# Patient Record
Sex: MALE | Race: WHITE | Employment: UNEMPLOYED | URBAN - METROPOLITAN AREA
[De-identification: names, ages, dates, MRNs, and addresses within clinical notes are randomized per-mention and may not be internally consistent; named-entity substitution may affect disease eponyms.]

---

## 2020-01-15 ENCOUNTER — CLINICAL SUPPORT (OUTPATIENT)
Dept: PULMONOLOGY | Age: 50
End: 2020-01-15

## 2020-01-15 VITALS — WEIGHT: 140 LBS | BODY MASS INDEX: 21.97 KG/M2 | HEIGHT: 67 IN

## 2020-01-15 DIAGNOSIS — J44.9 CHRONIC OBSTRUCTIVE PULMONARY DISEASE, UNSPECIFIED COPD TYPE (HCC): ICD-10-CM

## 2020-01-15 DIAGNOSIS — J45.909 UNCOMPLICATED ASTHMA, UNSPECIFIED ASTHMA SEVERITY, UNSPECIFIED WHETHER PERSISTENT: Primary | ICD-10-CM

## 2020-01-15 NOTE — PROGRESS NOTES
Verbal Order with read back per Delmus Burkitt, MD  For PFT smart panel. AMB POC PFT complete w/ bronchodilator  AMB POC PFT complete w/o bronchodilator    Dr. Chato López MD will co-sign the orders.

## 2020-02-06 ENCOUNTER — OFFICE VISIT (OUTPATIENT)
Dept: PULMONOLOGY | Age: 50
End: 2020-02-06

## 2020-02-06 VITALS
HEART RATE: 82 BPM | SYSTOLIC BLOOD PRESSURE: 128 MMHG | BODY MASS INDEX: 21.88 KG/M2 | RESPIRATION RATE: 18 BRPM | OXYGEN SATURATION: 96 % | WEIGHT: 139.4 LBS | TEMPERATURE: 97.4 F | DIASTOLIC BLOOD PRESSURE: 72 MMHG | HEIGHT: 67 IN

## 2020-02-06 DIAGNOSIS — J44.9 COPD WITH ASTHMA (HCC): Primary | ICD-10-CM

## 2020-02-06 RX ORDER — FLUTICASONE FUROATE AND VILANTEROL 200; 25 UG/1; UG/1
1 POWDER RESPIRATORY (INHALATION) DAILY
Qty: 1 INHALER | Refills: 3 | Status: SHIPPED | OUTPATIENT
Start: 2020-02-06 | End: 2020-02-18 | Stop reason: CLARIF

## 2020-02-06 RX ORDER — FLUTICASONE FUROATE AND VILANTEROL 200; 25 UG/1; UG/1
1 POWDER RESPIRATORY (INHALATION) DAILY
Qty: 1 INHALER | Refills: 0 | Status: SHIPPED | COMMUNITY
Start: 2020-02-06 | End: 2020-02-18 | Stop reason: CLARIF

## 2020-02-06 RX ORDER — FLUTICASONE FUROATE AND VILANTEROL 200; 25 UG/1; UG/1
1 POWDER RESPIRATORY (INHALATION) DAILY
Qty: 1 INHALER | Refills: 0 | Status: SHIPPED | COMMUNITY
Start: 2020-02-06 | End: 2020-02-06 | Stop reason: SDUPTHER
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
## 2020-02-06 NOTE — PROGRESS NOTES
Morgan Noreentorreykati presents today for   Chief Complaint   Patient presents with    Asthma    COPD    Shortness of Breath       Is someone accompanying this pt? No     Is the patient using any DME equipment during OV? No     -DME Company n/a     Depression Screening:  3 most recent PHQ Screens 2/6/2020   Little interest or pleasure in doing things Not at all   Feeling down, depressed, irritable, or hopeless Not at all   Total Score PHQ 2 0       Learning Assessment:  No flowsheet data found. Abuse Screening:  No flowsheet data found. Fall Risk  No flowsheet data found. Coordination of Care:  1. Have you been to the ER, urgent care clinic since your last visit? Hospitalized since your last visit? Yes; Name: [facility]    2. Have you seen or consulted any other health care providers outside of the [address] [place] [address] since your last visit? Include any pap smears or colon screening.  No

## 2020-02-18 ENCOUNTER — TELEPHONE (OUTPATIENT)
Dept: PULMONOLOGY | Age: 50
End: 2020-02-18

## 2020-02-18 NOTE — TELEPHONE ENCOUNTER
Pt states his insurance doesn't cover Breo and Incruse, wanting to know if there's alternatives. Please advise [phone].

## 2020-02-18 NOTE — TELEPHONE ENCOUNTER
Pt with Medicaid insurance.  Preferred meds are   Fluticasone Salmeterol or Dulera &  Atrovent HFA and maybe Spiriva Handihaler

## 2020-06-10 RX ORDER — ALBUTEROL SULFATE 90 UG/1
2 AEROSOL, METERED RESPIRATORY (INHALATION)
Qty: 1 INHALER | Refills: 1 | Status: SHIPPED | OUTPATIENT
Start: 2020-06-10 | End: 2020-08-06 | Stop reason: SDUPTHER

## 2020-06-10 RX ORDER — MOMETASONE FUROATE AND FORMOTEROL FUMARATE DIHYDRATE 100; 5 UG/1; UG/1
2 AEROSOL RESPIRATORY (INHALATION) 2 TIMES DAILY
Qty: 1 INHALER | Refills: 5 | Status: SHIPPED | OUTPATIENT
Start: 2020-06-10 | End: 2020-08-06 | Stop reason: SDUPTHER

## 2020-07-07 DIAGNOSIS — J45.909 UNCOMPLICATED ASTHMA, UNSPECIFIED ASTHMA SEVERITY, UNSPECIFIED WHETHER PERSISTENT: ICD-10-CM

## 2020-07-07 DIAGNOSIS — J44.9 COPD WITH ASTHMA (HCC): Primary | ICD-10-CM

## 2020-07-07 NOTE — PROGRESS NOTES
Order placed for covid-19 test, per Verbal Order from Dr. Ave Taylor on 7/7/2020. Last office visit: 2/06/2020  Follow up Visit: 7/22/2020    Provider is aware of last office visit and follow up. No further action requested from provider.

## 2020-07-16 ENCOUNTER — VIRTUAL VISIT (OUTPATIENT)
Dept: FAMILY MEDICINE CLINIC | Age: 50
End: 2020-07-16

## 2020-07-16 PROBLEM — F12.90 MARIJUANA USER: Status: ACTIVE | Noted: 2019-11-17

## 2020-07-16 PROBLEM — J44.9 COPD (CHRONIC OBSTRUCTIVE PULMONARY DISEASE) (HCC): Status: ACTIVE | Noted: 2019-11-16

## 2020-07-16 PROBLEM — Z91.199 NON-COMPLIANCE: Status: ACTIVE | Noted: 2019-11-17

## 2020-07-16 PROBLEM — I21.4 NSTEMI (NON-ST ELEVATED MYOCARDIAL INFARCTION) (HCC): Status: ACTIVE | Noted: 2020-06-24

## 2020-07-16 PROBLEM — Z78.9 DRINKS BEER: Status: ACTIVE | Noted: 2019-11-17

## 2020-07-16 RX ORDER — AMOXICILLIN 250 MG
1 CAPSULE ORAL 2 TIMES DAILY
COMMUNITY
Start: 2020-07-01 | End: 2020-11-11 | Stop reason: ALTCHOICE

## 2020-07-16 RX ORDER — GUAIFENESIN 100 MG/5ML
81 LIQUID (ML) ORAL DAILY
COMMUNITY
Start: 2020-07-02 | End: 2020-07-20 | Stop reason: SDUPTHER

## 2020-07-16 RX ORDER — BUPRENORPHINE HYDROCHLORIDE, NALOXONE HYDROCHLORIDE 12; 3 MG/1; MG/1
FILM, SOLUBLE BUCCAL; SUBLINGUAL
COMMUNITY
Start: 2020-05-11 | End: 2020-07-20 | Stop reason: ALTCHOICE

## 2020-07-16 RX ORDER — OXYCODONE HYDROCHLORIDE 5 MG/1
5 TABLET ORAL
COMMUNITY
Start: 2020-07-01 | End: 2020-11-11 | Stop reason: ALTCHOICE

## 2020-07-16 RX ORDER — LISINOPRIL 10 MG/1
10 TABLET ORAL DAILY
COMMUNITY
Start: 2020-07-01 | End: 2020-07-20 | Stop reason: SDUPTHER

## 2020-07-16 RX ORDER — IPRATROPIUM BROMIDE AND ALBUTEROL SULFATE 2.5; .5 MG/3ML; MG/3ML
SOLUTION RESPIRATORY (INHALATION)
COMMUNITY
Start: 2020-06-14 | End: 2020-07-16

## 2020-07-16 RX ORDER — SPIRONOLACTONE 25 MG/1
25 TABLET ORAL DAILY
COMMUNITY
Start: 2020-07-02 | End: 2020-07-20 | Stop reason: SDUPTHER

## 2020-07-16 RX ORDER — NALOXONE HYDROCHLORIDE 4 MG/.1ML
SPRAY NASAL
COMMUNITY
Start: 2020-05-04 | End: 2020-11-11

## 2020-07-16 RX ORDER — CARVEDILOL 12.5 MG/1
12.5 TABLET ORAL 2 TIMES DAILY WITH MEALS
COMMUNITY
Start: 2020-07-01 | End: 2020-07-20 | Stop reason: SDUPTHER

## 2020-07-16 RX ORDER — POLYETHYLENE GLYCOL 3350 17 G/17G
17 POWDER, FOR SOLUTION ORAL DAILY
COMMUNITY
Start: 2020-07-02 | End: 2020-07-20 | Stop reason: ALTCHOICE

## 2020-07-16 RX ORDER — ATORVASTATIN CALCIUM 40 MG/1
40 TABLET, FILM COATED ORAL
COMMUNITY
Start: 2020-07-01 | End: 2020-07-20 | Stop reason: SDUPTHER

## 2020-07-16 RX ORDER — ALBUTEROL SULFATE 0.83 MG/ML
2.5 SOLUTION RESPIRATORY (INHALATION)
COMMUNITY
Start: 2019-11-17 | End: 2021-06-04 | Stop reason: SDUPTHER

## 2020-07-16 RX ORDER — BUPRENORPHINE HYDROCHLORIDE, NALOXONE HYDROCHLORIDE 8; 2 MG/1; MG/1
FILM, SOLUBLE BUCCAL; SUBLINGUAL
COMMUNITY
Start: 2020-06-11 | End: 2020-07-20 | Stop reason: ALTCHOICE

## 2020-07-16 NOTE — PROGRESS NOTES
Chief Complaint   Patient presents with    New Patient    Other     Had Mycardial Infarction 6/24/20       1. Have you been to the ER, urgent care clinic since your last visit? Hospitalized since your last visit? Yes Brand[facility]la 55 MI 6/24/20    2. Have you seen or consulted any other health care providers outside of the [address] [place] [address] since your last visit? Include any pap smears or colon screening.  Cardiology

## 2020-07-16 NOTE — PROGRESS NOTES
This encounter was created in error - please disregard. intiated visit with patient. He stated he was not at home and did not have his medications with him. Stated he would need to reschedule. I explained to him I have all his information including meds and hospitalizations. However, if he would like to reschedule that would be fine. He then said he could do a visit tomorrow. I informed him I was booked tomorrow.  He then became beligerent and told me   \"you know I have a life too, [id number] [employer]holes\" and hung up

## 2020-07-20 ENCOUNTER — OFFICE VISIT (OUTPATIENT)
Dept: CARDIOLOGY CLINIC | Age: 50
End: 2020-07-20

## 2020-07-20 VITALS
HEART RATE: 95 BPM | WEIGHT: 128 LBS | BODY MASS INDEX: 20.09 KG/M2 | DIASTOLIC BLOOD PRESSURE: 70 MMHG | SYSTOLIC BLOOD PRESSURE: 94 MMHG | HEIGHT: 67 IN | OXYGEN SATURATION: 95 % | TEMPERATURE: 97.4 F

## 2020-07-20 DIAGNOSIS — I50.9 CONGESTIVE HEART FAILURE, UNSPECIFIED HF CHRONICITY, UNSPECIFIED HEART FAILURE TYPE (HCC): Primary | ICD-10-CM

## 2020-07-20 RX ORDER — SPIRONOLACTONE 25 MG/1
25 TABLET ORAL DAILY
Qty: 90 TAB | Refills: 3 | Status: SHIPPED | OUTPATIENT
Start: 2020-07-20

## 2020-07-20 RX ORDER — GUAIFENESIN 100 MG/5ML
81 LIQUID (ML) ORAL DAILY
Qty: 90 TAB | Refills: 3 | Status: SHIPPED | OUTPATIENT
Start: 2020-07-20 | End: 2021-01-13 | Stop reason: SDUPTHER

## 2020-07-20 RX ORDER — CARVEDILOL 12.5 MG/1
12.5 TABLET ORAL 2 TIMES DAILY WITH MEALS
Qty: 180 TAB | Refills: 3 | Status: SHIPPED | OUTPATIENT
Start: 2020-07-20 | End: 2020-11-11 | Stop reason: ALTCHOICE

## 2020-07-20 RX ORDER — ATORVASTATIN CALCIUM 40 MG/1
40 TABLET, FILM COATED ORAL
Qty: 90 TAB | Refills: 3 | Status: SHIPPED | OUTPATIENT
Start: 2020-07-20

## 2020-07-20 RX ORDER — LISINOPRIL 10 MG/1
10 TABLET ORAL DAILY
Qty: 90 TAB | Refills: 3 | Status: SHIPPED | OUTPATIENT
Start: 2020-07-20

## 2020-07-20 NOTE — PATIENT INSTRUCTIONS
Echo-Testing   Echo- ( to be done in 3 months ) Please call [facility] central scheduling at [phone]  to schedule an appointment.    All testing is completed at [address] [place] [address], [facility]  **call office three days after testing for results**

## 2020-07-20 NOTE — PROGRESS NOTES
Soledad Zee presents today for   Chief Complaint   Patient presents with   [facility] preferred language for health care discussion is english/other. Is someone accompanying this pt? no    Is the patient using any DME equipment during [address]? no    Depression Screening:  3 most recent PHQ Screens 7/20/2020   Little interest or pleasure in doing things Not at all   Feeling down, depressed, irritable, or hopeless Not at all   Total Score PHQ 2 0   Feeling tired or having little energy -   Poor appetite, weight loss, or overeating -   Feeling bad about yourself - or that you are a failure or have let yourself or your family down -   Trouble concentrating on things such as school, work, reading, or watching TV -   Moving or speaking so slowly that other people could have noticed; or the opposite being so fidgety that others notice -   Thoughts of being better off dead, or hurting yourself in some way -       Learning Assessment:  Learning Assessment 7/16/2020   PRIMARY LEARNER Patient   HIGHEST LEVEL OF EDUCATION - PRIMARY LEARNER  2 YEARS [address] CAREGIVER No   PRIMARY LANGUAGE ENGLISH   LEARNER PREFERENCE PRIMARY READING   ANSWERED BY Pari Carlson   RELATIONSHIP SELF       Abuse Screening:  No flowsheet data found. Fall Risk  Fall Risk Assessment, last 12 mths 7/16/2020   Able to walk? Yes   Fall in past 12 months? No       Pt currently taking Anticoagulant therapy? yes  Coordination of Care:  1. Have you been to the ER, urgent care clinic since your last visit? Hospitalized since your last visit? yes    2. Have you seen or consulted any other health care providers outside of the [address] [place] [address] since your last visit? Include any pap smears or colon screening.   yes

## 2020-07-22 ENCOUNTER — OFFICE VISIT (OUTPATIENT)
Dept: PULMONOLOGY | Age: 50
End: 2020-07-22

## 2020-07-22 VITALS
RESPIRATION RATE: 20 BRPM | OXYGEN SATURATION: 98 % | TEMPERATURE: 98.1 F | SYSTOLIC BLOOD PRESSURE: 90 MMHG | BODY MASS INDEX: 19.87 KG/M2 | DIASTOLIC BLOOD PRESSURE: 60 MMHG | HEIGHT: 67 IN | WEIGHT: 126.6 LBS | HEART RATE: 76 BPM

## 2020-07-22 DIAGNOSIS — J44.9 STAGE 3 SEVERE COPD BY GOLD CLASSIFICATION (HCC): Primary | ICD-10-CM

## 2020-07-22 DIAGNOSIS — I21.4 NSTEMI (NON-ST ELEVATED MYOCARDIAL INFARCTION) (HCC): ICD-10-CM

## 2020-07-22 DIAGNOSIS — J44.9 COPD WITH ASTHMA (HCC): ICD-10-CM

## 2020-07-22 DIAGNOSIS — J45.909 UNCOMPLICATED ASTHMA, UNSPECIFIED ASTHMA SEVERITY, UNSPECIFIED WHETHER PERSISTENT: ICD-10-CM

## 2020-07-22 NOTE — PATIENT INSTRUCTIONS
Chronic Obstructive Pulmonary Disease (COPD): Care Instructions  Your Care Instructions     Chronic obstructive pulmonary disease (COPD) is a general term for a group of lung diseases, including emphysema and chronic bronchitis. People with COPD have decreased airflow in and out of the lungs, which makes it hard to breathe. The airways also can get clogged with thick mucus. Cigarette smoking is a major cause of COPD. Although there is no cure for COPD, you can slow its progress. Following your treatment plan and taking care of yourself can help you feel better and live longer. Follow-up care is a key part of your treatment and safety. Be sure to make and go to all appointments, and call your doctor if you are having problems. It's also a good idea to know your test results and keep a list of the medicines you take. How can you care for yourself at home? Staying healthy  · Do not smoke. This is the most important step you can take to prevent more damage to your lungs. If you need help quitting, talk to your doctor about stop-smoking programs and medicines. These can increase your chances of quitting for good. · Avoid colds and flu. Get a pneumococcal vaccine shot. If you have had one before, ask your doctor whether you need a second dose. Get the flu vaccine every fall. If you must be around people with colds or the flu, wash your hands often. · Avoid secondhand smoke, air pollution, and high altitudes. Also avoid cold, dry air and hot, humid air. Stay at home with your windows closed when air pollution is bad. Medicines and oxygen therapy  · Take your medicines exactly as prescribed. Call your doctor if you think you are having a problem with your medicine. You may be taking medicines such as:  ? Bronchodilators. These help open your airways and make breathing easier. They are either short-acting (work for 6 to 9 hours) or long-acting (work for 24 hours).  You inhale most bronchodilators, so they start to act quickly. Always carry your quick-relief inhaler with you in case you need it while you are away from home. ? Corticosteroids (prednisone, budesonide). These reduce airway inflammation. They come in pill or inhaled form. You must take these medicines every day for them to work well. · Ask your doctor or pharmacist if a spacer is right for you. A spacer may help you get more inhaled medicine to your lungs. If you use one, ask how to use it properly. · Do not take any vitamins, over-the-counter medicine, or herbal products without talking to your doctor first.  · If your doctor prescribed antibiotics, take them as directed. Do not stop taking them just because you feel better. You need to take the full course of antibiotics. · If you use oxygen therapy, use the flow rate your doctor has recommended. Don't change it without talking to your doctor first. Oxygen therapy boosts the amount of oxygen in your blood and helps you breathe easier. Activity  · Get regular exercise. Walking is an easy way to get exercise. Start out slowly, and walk a little more each day. · Pay attention to your breathing. You are exercising too hard if you can't talk while you exercise. · Take short rest breaks when doing household chores and other activities. · Learn breathing methods--such as breathing through pursed lips--to help you become less short of breath. · If your doctor has not set you up with a pulmonary rehabilitation program, ask if rehab is right for you. Rehab includes exercise programs, education about your disease and how to manage it, help with diet and other changes, and emotional support. Diet  · Eat regular, healthy meals. Use bronchodilators about 1 hour before you eat to make it easier to eat. Eat several small meals instead of three large ones. Drink beverages at the end of the meal. Avoid foods that are hard to chew. · Eat foods that contain protein so you don't lose muscle mass.   · Talk with your doctor if you gain too much weight or if you lose weight without trying. Mental health  · Talk to your family, friends, or a therapist about your feelings. Some people feel frightened, angry, hopeless, helpless, and even guilty. Talking openly about bad feelings can help you cope. If these feelings last, talk to your doctor. When should you call for help? [id number] anytime you think you may need emergency care. For example, call if:  · You have severe trouble breathing. Call your doctor now or seek immediate medical care if:  · You have new or worse trouble breathing. · You cough up blood. · You have a fever. Watch closely for changes in your health, and be sure to contact your doctor if:  · You cough more deeply or more often, especially if you notice more mucus or a change in the color of your mucus. · You have new or worse swelling in your legs or belly. · You are not getting better as expected. Where can you learn more? Go to http://katiuska-venessa.info/  Enter H561 in the search box to learn more about \"Chronic Obstructive Pulmonary Disease (COPD): Care Instructions. \"  Current as of: February 24, 2020               Content Version: 12.5  © 0506-4875 Healthwise, Incorporated. Care instructions adapted under license by [employer] (which disclaims liability or warranty for this information). If you have questions about a medical condition or this instruction, always ask your healthcare professional. [place] [address] any warranty or liability for your use of this information.
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
## 2020-07-22 NOTE — PROGRESS NOTES
The pt. States he had an MI 6/24 [facility] SPECIAL SURGERY.    Was given a Neb upon disch. From [facility]. Will need a supplies order. Teresa Jones presents today for   Chief Complaint   Patient presents with    Breathing Problem     F/U to 2/6  Alpha 1 2/6       Is someone accompanying this pt? No    Is the patient using any DME equipment during [address]? nebulizer    -DME Company     Depression Screening:  3 most recent PHQ Screens 7/20/2020   Little interest or pleasure in doing things Not at all   Feeling down, depressed, irritable, or hopeless Not at all   Total Score PHQ 2 0   Feeling tired or having little energy -   Poor appetite, weight loss, or overeating -   Feeling bad about yourself - or that you are a failure or have let yourself or your family down -   Trouble concentrating on things such as school, work, reading, or watching TV -   Moving or speaking so slowly that other people could have noticed; or the opposite being so fidgety that others notice -   Thoughts of being better off dead, or hurting yourself in some way -       Learning Assessment:  Learning Assessment 7/16/2020   PRIMARY LEARNER Patient   HIGHEST LEVEL OF EDUCATION - PRIMARY LEARNER  2 YEARS [address] CAREGIVER No   PRIMARY LANGUAGE ENGLISH   LEARNER PREFERENCE PRIMARY READING   ANSWERED BY Kareem Alvarez   RELATIONSHIP SELF       Abuse Screening: In past.  Smokes marijuana x 2 daily    Fall Risk  Fall Risk Assessment, last 12 mths 7/16/2020   Able to walk? Yes   Fall in past 12 months? No         Coordination of Care:  1. Have you been to the ER, urgent care clinic since your last visit? Hospitalized since your last visit? [facility] 6/24    2. Have you seen or consulted any other health care providers outside of the [address] [place] [address] since your last visit? [facility]    Medication variance in dosage/sig per patient as follows: Per Med. Rec.

## 2020-07-27 ENCOUNTER — HOSPITAL ENCOUNTER (OUTPATIENT)
Dept: NON INVASIVE DIAGNOSTICS | Age: 50
Discharge: HOME | End: 2020-07-27
Attending: INTERNAL MEDICINE
Payer: COMMERCIAL

## 2020-07-27 VITALS
BODY MASS INDEX: 19.78 KG/M2 | SYSTOLIC BLOOD PRESSURE: 90 MMHG | DIASTOLIC BLOOD PRESSURE: 60 MMHG | HEIGHT: 67 IN | WEIGHT: 126 LBS

## 2020-07-27 DIAGNOSIS — I50.9 CONGESTIVE HEART FAILURE, UNSPECIFIED HF CHRONICITY, UNSPECIFIED HEART FAILURE TYPE (HCC): ICD-10-CM

## 2020-07-27 LAB
ECHO AO ROOT DIAM: 0 CM
ECHO IVC PROX: 1.42 CM
ECHO IVC SNIFF: 1.42 CM
ECHO LA MAJOR AXIS: 3.22 CM
ECHO LA MINOR AXIS: 1.94 CM
ECHO LV EDV A2C: 91.9 ML
ECHO LV EDV A4C: 101 ML
ECHO LV EDV BP: 99 ML (ref 67–155)
ECHO LV EDV INDEX A4C: 60.8 ML/M2
ECHO LV EDV INDEX BP: 59.6 ML/M2
ECHO LV EDV NDEX A2C: 55.3 ML/M2
ECHO LV EDV TEICHHOLZ: 0.48 ML
ECHO LV EJECTION FRACTION A2C: 42 %
ECHO LV EJECTION FRACTION A4C: 47 %
ECHO LV EJECTION FRACTION BIPLANE: 43.9 % (ref 55–100)
ECHO LV ESV A2C: 53.6 ML
ECHO LV ESV A4C: 53.2 ML
ECHO LV ESV BP: 55.5 ML (ref 22–58)
ECHO LV ESV INDEX A2C: 32.3 ML/M2
ECHO LV ESV INDEX A4C: 32 ML/M2
ECHO LV ESV INDEX BP: 33.4 ML/M2
ECHO LV ESV TEICHHOLZ: 0.44 ML
ECHO LV INTERNAL DIMENSION DIASTOLIC: 4.25 CM (ref 4.2–5.9)
ECHO LV INTERNAL DIMENSION SYSTOLIC: 4.1 CM
ECHO LV IVSD: 0.9 CM (ref 0.6–1)
ECHO LV MASS 2D: 117.3 G (ref 88–224)
ECHO LV MASS INDEX 2D: 70.6 G/M2 (ref 49–115)
ECHO LV POSTERIOR WALL DIASTOLIC: 0.86 CM (ref 0.6–1)
ECHO LVOT CARDIAC OUTPUT: 12.38 LITER/MINUTE
ECHO LVOT DIAM: 1.86 CM
ECHO LVOT PEAK GRADIENT: 5.3 MMHG
ECHO LVOT PEAK VELOCITY: 115.33 CM/S
ECHO LVOT SV: 71 ML
ECHO LVOT VTI: 26.02 CM
ECHO MV A VELOCITY: 46.84 CM/S
ECHO MV E DECELERATION TIME (DT): 335 MS
ECHO MV E VELOCITY: 46.11 CM/S
ECHO MV E/A RATIO: 0.98
ECHO RA MINOR AXIS: 3.7 CM
ECHO TV REGURGITANT MAX VELOCITY: 164.27 CM/S
ECHO TV REGURGITANT PEAK GRADIENT: 10.8 MMHG
LVFS 2D: 3.47 %
LVOT MG: 2.66 MMHG
LVOT MV: 0.76 CM/S
LVSV (MOD BI): 26.46 ML
LVSV (MOD SINGLE 4C): 29.1 ML
LVSV (MOD SINGLE): 23.32 ML
LVSV (TEICH): 3.94 ML
MV DEC SLOPE: 1.38

## 2020-07-27 PROCEDURE — 93306 TTE W/DOPPLER COMPLETE: CPT

## 2020-08-04 ENCOUNTER — HOSPITAL ENCOUNTER (OUTPATIENT)
Dept: CARDIAC REHAB | Age: 50
Discharge: HOME | End: 2020-08-04
Payer: COMMERCIAL

## 2020-08-06 RX ORDER — ALBUTEROL SULFATE 90 UG/1
2 AEROSOL, METERED RESPIRATORY (INHALATION)
Qty: 2 INHALER | Refills: 3 | Status: SHIPPED | OUTPATIENT
Start: 2020-08-06 | End: 2021-02-08 | Stop reason: SDUPTHER

## 2020-08-06 RX ORDER — MOMETASONE FUROATE AND FORMOTEROL FUMARATE DIHYDRATE 100; 5 UG/1; UG/1
2 AEROSOL RESPIRATORY (INHALATION) 2 TIMES DAILY
Qty: 1 INHALER | Refills: 5 | Status: SHIPPED | OUTPATIENT
Start: 2020-08-06 | End: 2021-01-13 | Stop reason: SDUPTHER

## 2020-08-06 NOTE — TELEPHONE ENCOUNTER
Pt requesting refills on albuterol and Dulera to be sent to [facility]. Pt would like to know if he can get 2 albuterols at a time.

## 2020-08-07 ENCOUNTER — HOSPITAL ENCOUNTER (OUTPATIENT)
Dept: CARDIAC REHAB | Age: 50
Discharge: HOME | End: 2020-08-07
Payer: COMMERCIAL

## 2020-08-07 VITALS — WEIGHT: 121 LBS | BODY MASS INDEX: 18.95 KG/M2

## 2020-08-07 PROCEDURE — 93798 PHYS/QHP OP CAR RHAB W/ECG: CPT

## 2020-08-07 NOTE — PROGRESS NOTES
Outpatient Behavioral Health Evaluation Order    Piyush Moore is a 52y.o. year old male with Non-ST elevation (NSTEMI) myocardial infarction [I21.4]. He is enrolled in cardiac rehabilitation and the treating clinician believes the patient is demonstrating signs of anxiety, depression, substance abuse and alcoholism. Lira [id number] admitting PHQ-9 depression score was 21 which is considered severe. Please co-sign this note if you would like your patient to be evaluated by a LCSW in our clinic. Thank you.

## 2020-08-07 NOTE — PROGRESS NOTES
INITIAL CARDIAC REHAB ITP FOR REVIEW AND SIGNATURE    Cardiac ITP      CR INTAKE from 8/7/2020 in [facility]    Treatment Diagnosis 1  NSTEMI    NSTEMI Date  06/24/20    Referral Date  07/28/20    Significant Cardiovascular History  --  [CAD, HTN]    ITP Visit Type  Initial Assessment    1st Date of Exercise   08/07/20    ITP Next Review Date  09/07/20    Visit #/Total Visits  1/36    EF %  50 %    Risk Stratification  Moderate    ITP  Exercise, Psychosocial, Tobacco, Education, Nutrition    Stages of Change  Preparation    DASI Total Score  24.2    Total Score  0    Safety Level I  --    Safety Level II  --    Test  Six minute walk test    Mode  Treadmill, Bike, Stepper, Ergometer    Frequency per week  2-3    Duration per session  35-55    Intensity  METS        2.5-4.0    RPE  11-13    Target Heart Rate  103-120    Resistance Training  Yes    Resting BP  98/69    Peak BP  115/76    Is BP WDL?   Yes    Type  walking    Frequency  3-4    Duration  30 minutes    Resistance Training  Yes    Education  Self pulse, Exercise safety, RPE scale, Signs/Symptoms to report    Target Goal(s)  Individual exercise RX    Patient Stated Exercise Goals  start lifting weights    Stages of Change  Preparation    [facility] Total Score  35    PHQ 9 Score  21    Interventions  Cardiologist notified    Len [address] worker    Currently Taking Psychotropic Meds  No    Medication Changes  No    Education  Impact self care behaviors on health    Target Goal(s)  Maximizes coping skills    Uses Stress Mgmt Techniques  No    Stages of Change  Preparation    Weight   54.9 kg (121 lb)    Height   5' 7\" (1.702 m)    BMI  18.99    Weight Goal  146lbs    Waist Circumference   32\"    Alcohol  Weekly    Type  beer, whiskey    Amount  10 drinks per week    Rate Your Plate Total Score  52    Dietitian Consult  Yes    Nurse/Patient Discussion  Yes    Nutrition Goals  learn what to eat    Nutrition Class  Yes    Education  Low sodium diet, Healthy eating    Patient Stated Nutrition Goals  eat more healthy proteins    Learning Barrier  Ready to learn    Education  CAD, Risk factors, Med Compliance    Hypertension  Yes    Hypertension Controlled  Yes    Is BP WDL? Yes    Med(s) Change  Yes    BP Meds  coreg, lisinopril,    Target Goals  Medication compliance, Risk factors    Exercise      CR INTAKE from 8/7/2020 in [facility]    Any problems changes since your last visit  Denies    Any symptoms while exercising  denies    Psychosocial/Stress Level  0    Resting EKG rhythm  SB    Tobacco Use  None    Enter O2 Saturation and Liter Flow  98    ITP Next Review Date  09/07/20    Visit Number/Total Visits  1/36    What is plan for next session  start exercise Rx    On Call Medical Director Immediately Available  Dionisio    Target Heart Rate(Range)  103-120    Resting HR  57    Resting BP  98/69    Recovery HR  54    Recovery BP  104/75    Weight  54.9 kg (121 lb)    Exercise EKG Rhythm  SR    Exercise Duration  6 minutes    Peak HR  77    Peak BP  115/76    Peak RPE  11    Peak Mets  2.9    SOB  0    Asymptomatic  yes    Total Minutes  6        Cardiac Rehab Initial Treatment Note/Plan    Maik Vieyra 52 y.o. presented to cardiac rehab for an intake and a six minute walk test today with a primary diagnosis of NSTEMI. Patient's EF is 45-50%. Irma Malhotra has a history of Hypertension, Hyperlipidemia, Coronary artery disease and status post coronary artery stenting. Cardiac risk factors include smoking/ tobacco exposure, dyslipidemia, alcohol/drug abuse, hypertension, stress and these were reviewed with patient. Irma Malhotra is not  and lives with son. PHQ-9, depression score, is 21 and this is considered to bevery high. The result was discussed with patient who admits the score to be accurate and social work visits were encouraged.     Patient denied chest pain or SOB during 6 minute walk and the cardiac rhythm was in (Normal Sinus Rhythm. Kristi Leon will attend exercise and educational sessions 2 days a week in cardiac rehab for 36 sessions. Goals for Rehab:    Patient name: Kristi Leon : 1970         Goals Comments   1. Start lifting weights   [x] initial  [] met                  [] not met  [] progressing Start with resistance training. Increase weights by 2lbs by next recert   2. Gain 25lbs   [x] initial  [] met                  [] not met  [] progressing Start eating more healthy proteins and stop drinking alcohol    3.  Stop drinking alcohol    [x] initial  [] met                  [] not met  [] progressing Decrease from 10 drinks per week to 5 drinks per week by next recert                 Navya Jameson RN 2020 10:01 AM

## 2020-08-11 ENCOUNTER — HOSPITAL ENCOUNTER (OUTPATIENT)
Dept: CARDIAC REHAB | Age: 50
Discharge: HOME | End: 2020-08-11
Payer: COMMERCIAL

## 2020-08-11 VITALS — BODY MASS INDEX: 19.11 KG/M2 | WEIGHT: 122 LBS

## 2020-08-11 PROCEDURE — 93797 PHYS/QHP OP CAR RHAB WO ECG: CPT

## 2020-08-11 PROCEDURE — 93798 PHYS/QHP OP CAR RHAB W/ECG: CPT

## 2020-08-13 ENCOUNTER — HOSPITAL ENCOUNTER (OUTPATIENT)
Dept: CARDIAC REHAB | Age: 50
Discharge: HOME | End: 2020-08-13
Payer: COMMERCIAL

## 2020-08-13 VITALS — WEIGHT: 124 LBS | BODY MASS INDEX: 19.42 KG/M2

## 2020-08-13 PROCEDURE — 93798 PHYS/QHP OP CAR RHAB W/ECG: CPT

## 2020-08-18 ENCOUNTER — HOSPITAL ENCOUNTER (OUTPATIENT)
Dept: CARDIAC REHAB | Age: 50
Discharge: HOME | End: 2020-08-18
Payer: COMMERCIAL

## 2020-08-18 VITALS — BODY MASS INDEX: 19.42 KG/M2 | WEIGHT: 124 LBS

## 2020-08-18 PROCEDURE — 93797 PHYS/QHP OP CAR RHAB WO ECG: CPT

## 2020-08-18 PROCEDURE — 93798 PHYS/QHP OP CAR RHAB W/ECG: CPT

## 2020-08-18 NOTE — PROGRESS NOTES
Pt. Initial BP in the 90's to low 282'F systolic when coming in for cardiac rehab. Pt. Complains of some intermittent dizziness. Updated pt. cardiologist Dr. Lourdes Anderson via text page. Will wait to hear back about a possible change in the pt. medications.

## 2020-08-20 ENCOUNTER — APPOINTMENT (OUTPATIENT)
Dept: CARDIAC REHAB | Age: 50
End: 2020-08-20
Payer: COMMERCIAL

## 2020-08-25 ENCOUNTER — HOSPITAL ENCOUNTER (OUTPATIENT)
Dept: CARDIAC REHAB | Age: 50
Discharge: HOME | End: 2020-08-25
Payer: COMMERCIAL

## 2020-08-25 VITALS — BODY MASS INDEX: 19.42 KG/M2 | WEIGHT: 124 LBS

## 2020-08-25 PROCEDURE — 93797 PHYS/QHP OP CAR RHAB WO ECG: CPT

## 2020-08-25 PROCEDURE — 93798 PHYS/QHP OP CAR RHAB W/ECG: CPT

## 2020-09-03 ENCOUNTER — HOSPITAL ENCOUNTER (OUTPATIENT)
Dept: CARDIAC REHAB | Age: 50
Discharge: HOME | End: 2020-09-03
Payer: COMMERCIAL

## 2020-09-03 VITALS — WEIGHT: 125 LBS | BODY MASS INDEX: 19.58 KG/M2

## 2020-09-03 PROCEDURE — 90791 PSYCH DIAGNOSTIC EVALUATION: CPT

## 2020-09-03 PROCEDURE — 93798 PHYS/QHP OP CAR RHAB W/ECG: CPT

## 2020-09-03 PROCEDURE — 93797 PHYS/QHP OP CAR RHAB WO ECG: CPT

## 2020-09-04 NOTE — PROGRESS NOTES
CARDIAC ITP REASSESSMENT FOR REVIEW AND SIGNATURE       Patient name: Teresa Jones : 1970     Visits from Start of Care: 10      Reporting Period:  to     Subjective Reports: sometimes lightheaded when standing up to quickly, otherwise doing well in cardiac rehab    Goals Comments   1. Increase resistance weight   [] met                  [] not met  [x] progressing  Increase dumbbell weights by 1lb by next recert. 2. Gain 25lb, current weight is 125lbs   [] met                  [] not met  [] progressing Has gained 4lb since start of program. Gain 2lb during this recert period     Key functional changes: Increased peak METS from 2.9 to 3.9     Problems/ barriers to goal attainment: None    Assessment / Recommendations:Continue w/ rehab Rx    William Lane RN 2020 3:27 PM    Cardiac ITP      CR PHASE II from 9/3/2020 in [place] [address]    Treatment Diagnosis 1  NSTEMI    NSTEMI Date  20    Referral Date  20    Significant Cardiovascular History  --  [CAD, HTN]    ITP Visit Type  Re-Assessment    1st Date of Exercise   20    ITP Next Review Date  10/04/20    Visit #/Total Visits  10/36    EF %  50 %    Risk Stratification  Moderate    ITP  Exercise, Psychosocial, Tobacco, Education, Nutrition    Stages of Change  Action    Mode  Treadmill, Bike, Stepper, Ergometer    Frequency per week  2-3    Duration per session  35-55    Intensity  METS        2.5-4.0    RPE  11-13    Target Heart Rate  103-120    Resistance Training  Yes    Resting BP  105/76    Peak BP  108/76    Is BP WDL?   Yes    Type  walking    Frequency  3-4    Duration  30 minutes    Resistance Training  Yes    Education  Self pulse, Exercise safety, RPE scale, Signs/Symptoms to report    Target Goal(s)  Individual exercise RX    Patient Stated Exercise Goals  start lifting weights    Stages of Change  Maintenance    Medication Changes  No    Education  Impact self care behaviors on health    Target Goal(s)  Maximizes coping skills    Uses Stress Mgmt Techniques  No    Stages of Change  Maintenance    Weight   56.7 kg (125 lb)    Height   5' 7\" (1.702 m)    BMI  19.62    Weight Goal  146lbs    Waist Circumference   32\"    Alcohol  Weekly    Type  beer, whiskey    Amount  10 drinks per week    Dietitian Consult  Yes    Nurse/Patient Discussion  Yes    Nutrition Goals  learn what to eat    Nutrition Class  Yes    Patient Stated Nutrition Goals  eat more healthy proteins    Learning Barrier  Ready to learn    Education  CAD, Risk factors, Med Compliance    Hypertension  Yes    Hypertension Controlled  Yes    Is BP WDL?   Yes    Med(s) Change  Yes    BP Meds  coreg, lisinopril,    Target Goals  Medication compliance, Risk factors    Exercise      CR PHASE II from 9/3/2020 in [facility]   Most recent reading at 9/4/2020  3:36 PM    Any problems changes since your last visit  Denies    Any symptoms while exercising  denies    Psychosocial/Stress Level  0    Resting EKG rhythm  SR    Tobacco Use  None    ITP Next Review Date  10/04/20    Visit Number/Total Visits  10/36  [education class (exercise)]    On Call Medical Director Immediately Available  Joseph    Target Heart Rate(Range)  103-120    Resting HR  59    Resting BP  105/76    Recovery HR  72    Recovery BP  109/76    Weight  56.7 kg (125 lb)    Exercise EKG Rhythm  SR    Exercise Duration  45    Peak HR  100    Peak RPE  14    Peak Mets  3.9    Asymptomatic  yes    Total Minutes  55

## 2020-09-08 ENCOUNTER — HOSPITAL ENCOUNTER (OUTPATIENT)
Dept: CARDIAC REHAB | Age: 50
Discharge: HOME | End: 2020-09-08
Payer: COMMERCIAL

## 2020-09-08 VITALS — WEIGHT: 125 LBS | BODY MASS INDEX: 19.58 KG/M2

## 2020-09-08 PROCEDURE — 93797 PHYS/QHP OP CAR RHAB WO ECG: CPT

## 2020-09-08 PROCEDURE — 93798 PHYS/QHP OP CAR RHAB W/ECG: CPT

## 2020-09-15 ENCOUNTER — HOSPITAL ENCOUNTER (OUTPATIENT)
Dept: CARDIAC REHAB | Age: 50
Discharge: HOME | End: 2020-09-15
Payer: COMMERCIAL

## 2020-09-15 VITALS — WEIGHT: 126 LBS | BODY MASS INDEX: 19.73 KG/M2

## 2020-09-15 PROCEDURE — 93797 PHYS/QHP OP CAR RHAB WO ECG: CPT

## 2020-09-15 PROCEDURE — 93798 PHYS/QHP OP CAR RHAB W/ECG: CPT

## 2020-09-17 ENCOUNTER — HOSPITAL ENCOUNTER (OUTPATIENT)
Dept: CARDIAC REHAB | Age: 50
Discharge: HOME | End: 2020-09-17
Payer: COMMERCIAL

## 2020-09-17 VITALS — BODY MASS INDEX: 19.73 KG/M2 | WEIGHT: 126 LBS

## 2020-09-17 PROCEDURE — 93798 PHYS/QHP OP CAR RHAB W/ECG: CPT

## 2020-09-24 ENCOUNTER — HOSPITAL ENCOUNTER (OUTPATIENT)
Dept: CARDIAC REHAB | Age: 50
Discharge: HOME | End: 2020-09-24
Payer: COMMERCIAL

## 2020-09-24 PROCEDURE — 93798 PHYS/QHP OP CAR RHAB W/ECG: CPT

## 2020-09-29 ENCOUNTER — HOSPITAL ENCOUNTER (OUTPATIENT)
Dept: CARDIAC REHAB | Age: 50
Discharge: HOME | End: 2020-09-29
Payer: COMMERCIAL

## 2020-09-29 VITALS — BODY MASS INDEX: 19.58 KG/M2 | WEIGHT: 125 LBS

## 2020-09-29 PROCEDURE — 93798 PHYS/QHP OP CAR RHAB W/ECG: CPT

## 2020-10-01 ENCOUNTER — HOSPITAL ENCOUNTER (OUTPATIENT)
Dept: CARDIAC REHAB | Age: 50
Discharge: HOME | End: 2020-10-01
Payer: COMMERCIAL

## 2020-10-01 VITALS — BODY MASS INDEX: 19.58 KG/M2 | WEIGHT: 125 LBS

## 2020-10-01 PROCEDURE — 93798 PHYS/QHP OP CAR RHAB W/ECG: CPT

## 2020-10-02 NOTE — PROGRESS NOTES
CARDIAC ITP REASSESSMENT FOR REVIEW AND SIGNATURE         Patient name: Erica Mosqueda : 1970      Visits from Start of Care: 18                                                 Reporting Period:  to 10/2     Subjective Reports: sometimes lightheaded when standing up to quickly, otherwise doing well in cardiac rehab          Goals Comments   1. Increase resistance weight    []? met                      []? not met  [x]? progressing  Increase dumbbell weights by 1lb by next recert. 2. Gain 25lb, current weight is 125lbs    []? met                      []? not met  []? progressing Has gained 4lb since start of program. Gain 2lb during this recert period      Key functional changes: Increased peak METS from 2.9 to 3.9      Problems/ barriers to goal attainment: None     Assessment / Recommendations:Continue w/ rehab Rx     Sary Kitchen RN 10/2/2020 11:21 AM    Cardiac ITP      CR PHASE II from 10/1/2020 in [place] [address]    Treatment Diagnosis 1  NSTEMI    NSTEMI Date  20    Referral Date  20    Significant Cardiovascular History  --  [CAD, HTN]    ITP Visit Type  Re-Assessment    1st Date of Exercise   20    ITP Next Review Date  20    Visit #/Total Visits      EF %  50 %    Risk Stratification  Moderate    ITP  Exercise, Psychosocial, Tobacco, Education, Nutrition    Stages of Change  Action    Mode  Treadmill, Bike, Stepper, Ergometer    Frequency per week  2-3    Duration per session  35-55    Intensity  METS        2.5-4.0    RPE  11-13    Target Heart Rate  103-120    Resistance Training  Yes    Resting BP  114/65    Peak BP  114/65    Is BP WDL?   Yes    Type  walking    Frequency  3-4    Duration  30 minutes    Resistance Training  Yes    Education  Self pulse, Exercise safety, RPE scale, Signs/Symptoms to report    Target Goal(s)  Individual exercise RX    Patient Stated Exercise Goals  start lifting weights    Stages of Change  Maintenance    Medication Changes  No Education  Impact self care behaviors on health    Target Goal(s)  Maximizes coping skills    Uses Stress Mgmt Techniques  No    Stages of Change  Maintenance    Weight   56.7 kg (125 lb)    Height   5' 7\" (1.702 m)    Weight Goal  146lbs    Waist Circumference   32\"    Alcohol  Weekly    Type  beer, whiskey    Amount  10 drinks per week    Dietitian Consult  Yes    Nurse/Patient Discussion  Yes    Nutrition Goals  learn what to eat    Nutrition Class  Yes    Patient Stated Nutrition Goals  eat more healthy proteins    Learning Barrier  Ready to learn    Education  CAD, Risk factors, Med Compliance    Hypertension  Yes    Hypertension Controlled  Yes    Is BP WDL?   Yes    Med(s) Change  Yes  [COREG DECREASED TO 12.5MG]    BP Meds  coreg, lisinopril,    Target Goals  Medication compliance, Risk factors    Exercise      CR PHASE II from 10/1/2020 in [facility]    Any problems changes since your last visit  Denies    Any symptoms while exercising  denies    Psychosocial/Stress Level  0    Resting EKG rhythm  SB    Tobacco Use  None    ITP Next Review Date  11/03/20    Visit Number/Total Visits  18/36    On Call Medical Director Immediately Available  Jake    Target Heart Rate(Range)  103-120    Resting HR  58    Resting BP  110/61    Recovery HR  64    Recovery BP  110/61    Weight  56.7 kg (125 lb)    Exercise EKG Rhythm  SR    Exercise Duration  45    Peak HR  95    Peak RPE  14    Peak Mets  3.9    Asymptomatic  yes    Total Minutes  55

## 2020-10-05 ENCOUNTER — HOSPITAL ENCOUNTER (OUTPATIENT)
Dept: LAB | Age: 50
Discharge: HOME | End: 2020-10-05
Payer: COMMERCIAL

## 2020-10-05 LAB
ALBUMIN SERPL-MCNC: 4 G/DL (ref 3.4–5)
ALBUMIN/GLOB SERPL: 1.3 {RATIO} (ref 0.8–1.7)
ALP SERPL-CCNC: 60 U/L (ref 45–117)
ALT SERPL-CCNC: 42 U/L (ref 16–61)
ANION GAP SERPL CALC-SCNC: 2 MMOL/L (ref 3–18)
AST SERPL-CCNC: 22 U/L (ref 10–38)
BILIRUB DIRECT SERPL-MCNC: 0.3 MG/DL (ref 0–0.2)
BILIRUB SERPL-MCNC: 1.3 MG/DL (ref 0.2–1)
BUN SERPL-MCNC: 15 MG/DL (ref 7–18)
BUN/CREAT SERPL: 16 (ref 12–20)
CALCIUM SERPL-MCNC: 9.2 MG/DL (ref 8.5–10.1)
CHLORIDE SERPL-SCNC: 101 MMOL/L (ref 100–111)
CHOLEST SERPL-MCNC: 123 MG/DL
CO2 SERPL-SCNC: 30 MMOL/L (ref 21–32)
CREAT SERPL-MCNC: 0.95 MG/DL (ref 0.6–1.3)
GLOBULIN SER CALC-MCNC: 3 G/DL (ref 2–4)
GLUCOSE SERPL-MCNC: 105 MG/DL (ref 74–99)
HDLC SERPL-MCNC: 66 MG/DL (ref 40–60)
HDLC SERPL: 1.9 {RATIO} (ref 0–5)
LDLC SERPL CALC-MCNC: 41.8 MG/DL (ref 0–100)
LIPID PROFILE,FLP: ABNORMAL
POTASSIUM SERPL-SCNC: 5.1 MMOL/L (ref 3.5–5.5)
PROT SERPL-MCNC: 7 G/DL (ref 6.4–8.2)
SODIUM SERPL-SCNC: 133 MMOL/L (ref 136–145)
TRIGL SERPL-MCNC: 76 MG/DL (ref ?–150)
VLDLC SERPL CALC-MCNC: 15.2 MG/DL

## 2020-10-05 PROCEDURE — 80048 BASIC METABOLIC PNL TOTAL CA: CPT

## 2020-10-05 PROCEDURE — 82103 ALPHA-1-ANTITRYPSIN TOTAL: CPT

## 2020-10-05 PROCEDURE — 80076 HEPATIC FUNCTION PANEL: CPT

## 2020-10-05 PROCEDURE — 36415 COLL VENOUS BLD VENIPUNCTURE: CPT

## 2020-10-05 PROCEDURE — 80061 LIPID PANEL: CPT

## 2020-10-06 ENCOUNTER — HOSPITAL ENCOUNTER (OUTPATIENT)
Dept: CARDIAC REHAB | Age: 50
Discharge: HOME | End: 2020-10-06
Payer: COMMERCIAL

## 2020-10-06 VITALS — BODY MASS INDEX: 19.58 KG/M2 | WEIGHT: 125 LBS

## 2020-10-06 PROCEDURE — 93798 PHYS/QHP OP CAR RHAB W/ECG: CPT

## 2020-10-07 LAB — A1AT SERPL-MCNC: 106 MG/DL (ref 101–187)

## 2020-10-08 ENCOUNTER — HOSPITAL ENCOUNTER (OUTPATIENT)
Dept: CARDIAC REHAB | Age: 50
Discharge: HOME | End: 2020-10-08
Payer: COMMERCIAL

## 2020-10-08 VITALS — BODY MASS INDEX: 19.58 KG/M2 | WEIGHT: 125 LBS

## 2020-10-08 PROCEDURE — 93798 PHYS/QHP OP CAR RHAB W/ECG: CPT

## 2020-10-13 ENCOUNTER — HOSPITAL ENCOUNTER (OUTPATIENT)
Dept: CARDIAC REHAB | Age: 50
Discharge: HOME | End: 2020-10-13
Payer: COMMERCIAL

## 2020-10-13 ENCOUNTER — HOSPITAL ENCOUNTER (EMERGENCY)
Age: 50
Discharge: LEFT BEFORE DISCHARGE | End: 2020-10-13
Attending: EMERGENCY MEDICINE | Admitting: EMERGENCY MEDICINE
Payer: COMMERCIAL

## 2020-10-13 ENCOUNTER — APPOINTMENT (OUTPATIENT)
Dept: GENERAL RADIOLOGY | Age: 50
End: 2020-10-13
Attending: EMERGENCY MEDICINE
Payer: COMMERCIAL

## 2020-10-13 ENCOUNTER — TELEPHONE (OUTPATIENT)
Dept: CARDIOLOGY CLINIC | Age: 50
End: 2020-10-13

## 2020-10-13 VITALS
BODY MASS INDEX: 20.88 KG/M2 | WEIGHT: 133 LBS | SYSTOLIC BLOOD PRESSURE: 100 MMHG | RESPIRATION RATE: 23 BRPM | HEIGHT: 67 IN | HEART RATE: 49 BPM | TEMPERATURE: 98 F | OXYGEN SATURATION: 99 % | DIASTOLIC BLOOD PRESSURE: 62 MMHG

## 2020-10-13 DIAGNOSIS — R00.1 BRADYCARDIA: Primary | ICD-10-CM

## 2020-10-13 DIAGNOSIS — F11.10 HEROIN ABUSE (HCC): ICD-10-CM

## 2020-10-13 DIAGNOSIS — I95.9 HYPOTENSION, UNSPECIFIED HYPOTENSION TYPE: ICD-10-CM

## 2020-10-13 LAB
ALBUMIN SERPL-MCNC: 3.5 G/DL (ref 3.4–5)
ALBUMIN/GLOB SERPL: 1.1 {RATIO} (ref 0.8–1.7)
ALP SERPL-CCNC: 50 U/L (ref 45–117)
ALT SERPL-CCNC: 42 U/L (ref 16–61)
ANION GAP SERPL CALC-SCNC: 4 MMOL/L (ref 3–18)
AST SERPL-CCNC: 23 U/L (ref 10–38)
ATRIAL RATE: 40 BPM
BASOPHILS # BLD: 0 K/UL (ref 0–0.1)
BASOPHILS NFR BLD: 0 % (ref 0–2)
BILIRUB SERPL-MCNC: 0.8 MG/DL (ref 0.2–1)
BUN SERPL-MCNC: 18 MG/DL (ref 7–18)
BUN/CREAT SERPL: 17 (ref 12–20)
CALCIUM SERPL-MCNC: 8.8 MG/DL (ref 8.5–10.1)
CALCULATED R AXIS, ECG10: 19 DEGREES
CALCULATED T AXIS, ECG11: 29 DEGREES
CHLORIDE SERPL-SCNC: 104 MMOL/L (ref 100–111)
CO2 SERPL-SCNC: 28 MMOL/L (ref 21–32)
CREAT SERPL-MCNC: 1.07 MG/DL (ref 0.6–1.3)
DIAGNOSIS, 93000: NORMAL
DIFFERENTIAL METHOD BLD: ABNORMAL
EOSINOPHIL # BLD: 0.8 K/UL (ref 0–0.4)
EOSINOPHIL NFR BLD: 9 % (ref 0–5)
ERYTHROCYTE [DISTWIDTH] IN BLOOD BY AUTOMATED COUNT: 13.6 % (ref 11.6–14.5)
ETHANOL SERPL-MCNC: <3 MG/DL (ref 0–3)
GLOBULIN SER CALC-MCNC: 3.1 G/DL (ref 2–4)
GLUCOSE SERPL-MCNC: 101 MG/DL (ref 74–99)
HCT VFR BLD AUTO: 34.6 % (ref 36–48)
HGB BLD-MCNC: 11.8 G/DL (ref 13–16)
LYMPHOCYTES # BLD: 2.6 K/UL (ref 0.9–3.6)
LYMPHOCYTES NFR BLD: 28 % (ref 21–52)
MAGNESIUM SERPL-MCNC: 2.2 MG/DL (ref 1.6–2.6)
MCH RBC QN AUTO: 32.2 PG (ref 24–34)
MCHC RBC AUTO-ENTMCNC: 34.1 G/DL (ref 31–37)
MCV RBC AUTO: 94.5 FL (ref 74–97)
MONOCYTES # BLD: 0.6 K/UL (ref 0.05–1.2)
MONOCYTES NFR BLD: 6 % (ref 3–10)
NEUTS SEG # BLD: 5.3 K/UL (ref 1.8–8)
NEUTS SEG NFR BLD: 57 % (ref 40–73)
P-R INTERVAL, ECG05: 232 MS
PLATELET # BLD AUTO: 295 K/UL (ref 135–420)
PMV BLD AUTO: 9.4 FL (ref 9.2–11.8)
POTASSIUM SERPL-SCNC: 4.5 MMOL/L (ref 3.5–5.5)
PROT SERPL-MCNC: 6.6 G/DL (ref 6.4–8.2)
Q-T INTERVAL, ECG07: 460 MS
QRS DURATION, ECG06: 88 MS
QTC CALCULATION (BEZET), ECG08: 374 MS
RBC # BLD AUTO: 3.66 M/UL (ref 4.7–5.5)
SODIUM SERPL-SCNC: 136 MMOL/L (ref 136–145)
TROPONIN I SERPL-MCNC: <0.02 NG/ML (ref 0–0.04)
VENTRICULAR RATE, ECG03: 40 BPM
WBC # BLD AUTO: 9.4 K/UL (ref 4.6–13.2)

## 2020-10-13 PROCEDURE — 99283 EMERGENCY DEPT VISIT LOW MDM: CPT

## 2020-10-13 PROCEDURE — 84484 ASSAY OF TROPONIN QUANT: CPT

## 2020-10-13 PROCEDURE — 83735 ASSAY OF MAGNESIUM: CPT

## 2020-10-13 PROCEDURE — 80053 COMPREHEN METABOLIC PANEL: CPT

## 2020-10-13 PROCEDURE — 85025 COMPLETE CBC W/AUTO DIFF WBC: CPT

## 2020-10-13 PROCEDURE — 93005 ELECTROCARDIOGRAM TRACING: CPT

## 2020-10-13 PROCEDURE — 80307 DRUG TEST PRSMV CHEM ANLYZR: CPT

## 2020-10-13 NOTE — TELEPHONE ENCOUNTER
Verbal order and read back per Janett Recio MD  Contact 911 for pt transport and if brought to [facility] I would consult

## 2020-10-13 NOTE — PROGRESS NOTES
Pt. Arrived in cardiac rehab today with slurred speech, constricted pupils, BP of 79/51, HR-47, and a slow respiratory rate. Patient was alert and oriented x4. Patient was taken in a separate room and asked if he had taken any drugs today. Patient appeared to be under the influence of a narcotic. Patient admitted to taking heroin this morning. Pt. Was advised that he should be evaluated in the ED. Pt. Refused to go the emergency department and stated that he wanted to go home. Patient stated that he drove to rehab this morning. Exercise specialist followed patient out to make sure that he would not drive home. Called on call medical director's office and spoke with Dr. Dayo Goodwin nurse. Dr. Almaz Cifuentes was notified of the situation by his office nurse. 911 called. Exercise specialist Boogie Avila was able to talk patient into going to the ER before arrival of the ambulance. Pt agreed to [facility] with the Exercise specialist.  Patient was checked in to the ER where care was transferred. 911 called back and cancelled.

## 2020-10-13 NOTE — TELEPHONE ENCOUNTER
Attempted to contact Brianda Morton at  number, no answer. Lvm on secure line per Dr Skyler Villafuerte instructions. Advised to contact office if further clarification was needed at [phone]. No other issues noted.

## 2020-10-13 NOTE — TELEPHONE ENCOUNTER
Incoming from [facility] cardiac rehab [facility]. Two patient Identifiers confirmed. Advised pt was in office with Bp 79/50 and pupuls were constricted. He stated he pulled pt to the side and pt had advised he had taken heroin. He stated pt refused Er and was attempting to leave clinic and drive. Advised Dr Yfn Henry was not in office but I would get a message to him regarding pt at hospital. Came into agreeance with Zandra Dennison that the best course of action would be to call 911 so that pt could be transported to ER and not harm others or him self while driving. Will f/u with Dr Yfn Henry.

## 2020-10-13 NOTE — ED PROVIDER NOTES
EMERGENCY DEPARTMENT HISTORY AND PHYSICAL EXAM    Date: 10/13/2020  Patient Name: Janee Hernandez    History of Presenting Illness     Chief Complaint   Patient presents with    Hypotension         History Provided By: Patient    Additional History (Context): Janee Hernandez is a 48 y.o. male with myocardial infarction, COPD, asthma and Chronic systolic heart failure, polysubstance abuse who presents with conflicting stories. Patient is a very poor historian. There is a note from Dr. Maria Pemberton office from the cardiac cath nursing team that the patient was seen pulled over using heroin/pills this morning. Patient's blood pressure is 81/57 and he is bradycardic. Patient says he is typically this way. He denies any chest pain or shortness of breath abdominal pain nausea vomiting diarrhea. He has a history of drinking 6 pack a day of beers using heroin via snorting as well as marijuana. He denies any recent cocaine. Feeling lightheaded. Last EF from echo on 10/7/20 shows EF of 55%. PCP: Madison Manjarrez MD    Current Facility-Administered Medications   Medication Dose Route Frequency Provider Last Rate Last Dose    sodium chloride 0.9 % bolus infusion 1,000 mL  1,000 mL IntraVENous ONCE Miranda Crockett PA        sodium chloride 0.9 % bolus infusion 500 mL  500 mL IntraVENous ONCE Miranda Crockett PA         Current Outpatient Medications   Medication Sig Dispense Refill    albuterol (PROVENTIL HFA, VENTOLIN HFA, PROAIR HFA) 90 mcg/actuation inhaler Take 2 Puffs by inhalation every four (4) hours as needed for Wheezing. 2 Inhaler 3    mometasone-formoterol (Dulera) 100-5 mcg/actuation HFA inhaler Take 2 Puffs by inhalation two (2) times a day. 1 Inhaler 5    aspirin 81 mg chewable tablet Take 1 Tab by mouth daily. 90 Tab 3    atorvastatin (LIPITOR) 40 mg tablet Take 1 Tab by mouth nightly. 90 Tab 3    carvediloL (COREG) 12.5 mg tablet Take 1 Tab by mouth two (2) times daily (with meals).  180 Tab 3    lisinopriL (PRINIVIL, ZESTRIL) 10 mg tablet Take 1 Tab by mouth daily. (Patient taking differently: Take 20 mg by mouth daily.) 90 Tab 3    spironolactone (ALDACTONE) 25 mg tablet Take 1 Tab by mouth daily. 90 Tab 3    ticagrelor (BRILINTA) 90 mg tablet Take 1 Tab by mouth two (2) times a day. 180 Tab 3    Narcan 4 mg/actuation nasal spray TAKE 1 INHALATION INTRANASALLY ONCE      senna-docusate (PERICOLACE) 8.6-50 mg per tablet Take 1 Tab by mouth two (2) times a day.  oxyCODONE IR (ROXICODONE) 5 mg immediate release tablet Take 5 mg by mouth every six (6) hours as needed.  Nebulizer Accessories misc by [employer] (Non-Drug; Combo Route) route.  albuterol (PROVENTIL VENTOLIN) 2.5 mg /3 mL (0.083 %) nebu Take 2.5 mg by inhalation.  tiotropium (SPIRIVA) 18 mcg inhalation capsule Take 1 Cap by inhalation daily. 30 Cap 5    albuterol (ACCUNEB) 1.25 mg/3 mL nebulizer solution 6 mL by Nebulization route every four (4) hours as needed for Wheezing or Shortness of Breath (wheezing). 22 Each 0       Past History     Past Medical History:  Past Medical History:   Diagnosis Date    Asthma     CAD (coronary artery disease)     Cardiomyopathy ([facility]     Chronic lung disease     Chronic obstructive pulmonary disease ([facility]     Myocardial infarction ([facility] 2020    Stage 3 severe COPD by GOLD classification ([facility] 2019    Substance abuse ([facility]     Marijuana, heroin, ETOH, cocaine       Past Surgical History:  History reviewed. No pertinent surgical history. Family History:  History reviewed. No pertinent family history.     Social History:  Social History     Tobacco Use    Smoking status: Former Smoker     Packs/day: 1.50     Years: 16.00     Pack years: 24.00     Types: Cigarettes     Start date: 1980     Last attempt to quit: 1994     Years since quittin.8    Smokeless tobacco: Former User    Tobacco comment: uses marijuana daily   Substance Use Topics    Alcohol use: No    Drug use: Not Currently     Types: Marijuana, Cocaine, Heroin     Comment: uses marijuana daily; last dose heroin 10/13/20       Allergies: Allergies   Allergen Reactions    Penicillins Rash         Review of Systems   Review of Systems   Respiratory: Negative for shortness of breath. Cardiovascular: Negative for chest pain. Gastrointestinal: Negative for abdominal pain, diarrhea, nausea and vomiting. Neurological: Positive for light-headedness. Negative for dizziness, weakness and numbness. All Other Systems Negative  Physical Exam     Vitals:    10/13/20 1001 10/13/20 1021 10/13/20 1022 10/13/20 1030   BP: (!) 81/57 (!) 91/55  100/62   Pulse: (!) 42  (!) 41 (!) 49   Resp: 20 17 23   Temp: 98 °F (36.7 °C)      SpO2: 100% 100% 99%    Weight: 60.3 kg (133 lb)      Height: 5' 7\" (1.702 m)        Physical Exam  Vitals signs and nursing note reviewed. Constitutional:       General: He is not in acute distress. Appearance: He is well-developed. He is not ill-appearing, toxic-appearing or diaphoretic. HENT:      Head: Normocephalic and atraumatic. Neck:      Musculoskeletal: Normal range of motion and neck supple. Thyroid: No thyromegaly. Vascular: No carotid bruit. Trachea: No tracheal deviation. Cardiovascular:      Rate and Rhythm: Regular rhythm. Bradycardia present. Heart sounds: Normal heart sounds. No murmur. No friction rub. No gallop. Pulmonary:      Effort: Pulmonary effort is normal. No respiratory distress. Breath sounds: Normal breath sounds. No stridor. No wheezing or rales. Chest:      Chest wall: No tenderness. Abdominal:      General: There is no distension. Palpations: Abdomen is soft. There is no mass. Tenderness: There is no abdominal tenderness. There is no guarding or rebound. Musculoskeletal: Normal range of motion. Skin:     General: Skin is warm and dry. Coloration: Skin is not pale.    Neurological:      Mental Status: He is alert. Psychiatric:         Speech: Speech normal.         Behavior: Behavior normal.         Thought Content: Thought content normal.         Judgment: Judgment normal.        Diagnostic Study Results     Labs -     Recent Results (from the past 12 hour(s))   CBC WITH AUTOMATED DIFF    Collection Time: 10/13/20 10:15 AM   Result Value Ref Range    WBC 9.4 4.6 - 13.2 K/uL    RBC 3.66 (L) 4.70 - 5.50 M/uL    HGB 11.8 (L) 13.0 - 16.0 g/dL    HCT 34.6 (L) 36.0 - 48.0 %    MCV 94.5 74.0 - 97.0 FL    MCH 32.2 24.0 - 34.0 PG    MCHC 34.1 31.0 - 37.0 g/dL    RDW 13.6 11.6 - 14.5 %    PLATELET 901 591 - 397 K/uL    MPV 9.4 9.2 - 11.8 FL    NEUTROPHILS 57 40 - 73 %    LYMPHOCYTES 28 21 - 52 %    MONOCYTES 6 3 - 10 %    EOSINOPHILS 9 (H) 0 - 5 %    BASOPHILS 0 0 - 2 %    ABS. NEUTROPHILS 5.3 1.8 - 8.0 K/UL    ABS. LYMPHOCYTES 2.6 0.9 - 3.6 K/UL    ABS. MONOCYTES 0.6 0.05 - 1.2 K/UL    ABS. EOSINOPHILS 0.8 (H) 0.0 - 0.4 K/UL    ABS. BASOPHILS 0.0 0.0 - 0.1 K/UL    DF AUTOMATED     METABOLIC PANEL, COMPREHENSIVE    Collection Time: 10/13/20 10:15 AM   Result Value Ref Range    Sodium 136 136 - 145 mmol/L    Potassium 4.5 3.5 - 5.5 mmol/L    Chloride 104 100 - 111 mmol/L    CO2 28 21 - 32 mmol/L    Anion gap 4 3.0 - 18 mmol/L    Glucose 101 (H) 74 - 99 mg/dL    BUN 18 7.0 - 18 MG/DL    Creatinine 1.07 0.6 - 1.3 MG/DL    BUN/Creatinine ratio 17 12 - 20      GFR est AA >60 >60 ml/min/1.73m2    GFR est non-AA >60 >60 ml/min/1.73m2    Calcium 8.8 8.5 - 10.1 MG/DL    Bilirubin, total 0.8 0.2 - 1.0 MG/DL    ALT (SGPT) 42 16 - 61 U/L    AST (SGOT) 23 10 - 38 U/L    Alk.  phosphatase 50 45 - 117 U/L    Protein, total 6.6 6.4 - 8.2 g/dL    Albumin 3.5 3.4 - 5.0 g/dL    Globulin 3.1 2.0 - 4.0 g/dL    A-G Ratio 1.1 0.8 - 1.7     TROPONIN I    Collection Time: 10/13/20 10:15 AM   Result Value Ref Range    Troponin-I, QT <0.02 0.0 - 0.045 NG/ML   ETHYL ALCOHOL    Collection Time: 10/13/20 10:15 AM   Result Value Ref Range ALCOHOL(ETHYL),SERUM <3 0 - 3 MG/DL   MAGNESIUM    Collection Time: 10/13/20 10:15 AM   Result Value Ref Range    Magnesium 2.2 1.6 - 2.6 mg/dL       Radiologic Studies -   XR CHEST PORT    (Results Pending)     CT Results  (Last 48 hours)    None        CXR Results  (Last 48 hours)    None            Medical Decision Making   I am the first provider for this patient. I reviewed the vital signs, available nursing notes, past medical history, past surgical history, family history and social history. Vital Signs-Reviewed the patient's vital signs. Records Reviewed: Nursing Notes, Old Medical Records, Previous Radiology Studies and Previous Laboratory Studies    Procedures:  EKG    Date/Time: 10/13/2020 10:25 AM  Performed by: NIVIA Zamarripa  Authorized by: NIVIA Zamarripa     ECG reviewed by ED Physician in the absence of a cardiologist: yes    Interpretation:     Interpretation: abnormal    Rate:     ECG rate:  40    ECG rate assessment: bradycardic    Rhythm:     Rhythm: sinus bradycardia and A-V block    Ectopy:     Ectopy: none    QRS:     QRS axis:  Normal  Conduction:     Conduction: abnormal      Abnormal conduction: 1st degree    ST segments:     ST segments:  Normal  T waves:     T waves: normal          Provider Notes (Medical Decision Making):   Patient refused to stay and left AMA. Last map was 67. Said his ride was waiting. MED RECONCILIATION:  Current Facility-Administered Medications   Medication Dose Route Frequency    sodium chloride 0.9 % bolus infusion 1,000 mL  1,000 mL IntraVENous ONCE    sodium chloride 0.9 % bolus infusion 500 mL  500 mL IntraVENous ONCE     Current Outpatient Medications   Medication Sig    albuterol (PROVENTIL HFA, VENTOLIN HFA, PROAIR HFA) 90 mcg/actuation inhaler Take 2 Puffs by inhalation every four (4) hours as needed for Wheezing.  mometasone-formoterol (Dulera) 100-5 mcg/actuation HFA inhaler Take 2 Puffs by inhalation two (2) times a day.     aspirin 81 mg chewable tablet Take 1 Tab by mouth daily.  atorvastatin (LIPITOR) 40 mg tablet Take 1 Tab by mouth nightly.  carvediloL (COREG) 12.5 mg tablet Take 1 Tab by mouth two (2) times daily (with meals).  lisinopriL (PRINIVIL, ZESTRIL) 10 mg tablet Take 1 Tab by mouth daily. (Patient taking differently: Take 20 mg by mouth daily.)    spironolactone (ALDACTONE) 25 mg tablet Take 1 Tab by mouth daily.  ticagrelor (BRILINTA) 90 mg tablet Take 1 Tab by mouth two (2) times a day.  Narcan 4 mg/actuation nasal spray TAKE 1 INHALATION INTRANASALLY ONCE    senna-docusate (PERICOLACE) 8.6-50 mg per tablet Take 1 Tab by mouth two (2) times a day.  oxyCODONE IR (ROXICODONE) 5 mg immediate release tablet Take 5 mg by mouth every six (6) hours as needed.  Nebulizer Accessories misc by [employer]. (Non-Drug; Combo Route) route.  albuterol (PROVENTIL VENTOLIN) 2.5 mg /3 mL (0.083 %) nebu Take 2.5 mg by inhalation.  tiotropium (SPIRIVA) 18 mcg inhalation capsule Take 1 Cap by inhalation daily.  albuterol (ACCUNEB) 1.25 mg/3 mL nebulizer solution 6 mL by Nebulization route every four (4) hours as needed for Wheezing or Shortness of Breath (wheezing). Disposition:  AMA    Follow-up Information    None         Current Discharge Medication List            Diagnosis     Clinical Impression:   1. Bradycardia    2. Hypotension, unspecified hypotension type    3.  Heroin abuse ([facility]

## 2020-10-15 ENCOUNTER — APPOINTMENT (OUTPATIENT)
Dept: CARDIAC REHAB | Age: 50
End: 2020-10-15
Payer: COMMERCIAL

## 2020-10-20 ENCOUNTER — APPOINTMENT (OUTPATIENT)
Dept: CARDIAC REHAB | Age: 50
End: 2020-10-20
Payer: COMMERCIAL

## 2020-10-22 ENCOUNTER — APPOINTMENT (OUTPATIENT)
Dept: CARDIAC REHAB | Age: 50
End: 2020-10-22
Payer: COMMERCIAL

## 2020-10-23 NOTE — PROGRESS NOTES
Maik Vieyra  48 y.o. With diagnosis of NSTEMI attended phase II cardiac rehab for 20 sessions. Patient is being discharged due to recent substance abuse on the day of his rehab session.       Kt Groves RN  10/23/2020

## 2020-10-27 ENCOUNTER — APPOINTMENT (OUTPATIENT)
Dept: CARDIAC REHAB | Age: 50
End: 2020-10-27
Payer: COMMERCIAL

## 2020-10-29 ENCOUNTER — APPOINTMENT (OUTPATIENT)
Dept: CARDIAC REHAB | Age: 50
End: 2020-10-29
Payer: COMMERCIAL

## 2020-11-11 ENCOUNTER — OFFICE VISIT (OUTPATIENT)
Dept: CARDIOLOGY CLINIC | Age: 50
End: 2020-11-11
Payer: COMMERCIAL

## 2020-11-11 VITALS
DIASTOLIC BLOOD PRESSURE: 59 MMHG | WEIGHT: 136 LBS | BODY MASS INDEX: 21.3 KG/M2 | OXYGEN SATURATION: 98 % | SYSTOLIC BLOOD PRESSURE: 96 MMHG | HEART RATE: 76 BPM | TEMPERATURE: 98.7 F

## 2020-11-11 DIAGNOSIS — I25.83 CORONARY ARTERY DISEASE DUE TO LIPID RICH PLAQUE: ICD-10-CM

## 2020-11-11 DIAGNOSIS — I10 ESSENTIAL HYPERTENSION WITH GOAL BLOOD PRESSURE LESS THAN 140/90: Primary | ICD-10-CM

## 2020-11-11 DIAGNOSIS — E78.00 PURE HYPERCHOLESTEROLEMIA: ICD-10-CM

## 2020-11-11 DIAGNOSIS — I25.5 ISCHEMIC CARDIOMYOPATHY: ICD-10-CM

## 2020-11-11 DIAGNOSIS — I25.10 CORONARY ARTERY DISEASE DUE TO LIPID RICH PLAQUE: ICD-10-CM

## 2020-11-11 PROCEDURE — 99214 OFFICE O/P EST MOD 30 MIN: CPT | Performed by: INTERNAL MEDICINE

## 2020-11-11 RX ORDER — GLUCOSAM/CHONDRO/HERB 149/HYAL 750-100 MG
1 TABLET ORAL DAILY
COMMUNITY

## 2020-11-11 RX ORDER — CARVEDILOL 6.25 MG/1
TABLET ORAL 2 TIMES DAILY WITH MEALS
COMMUNITY

## 2020-11-11 RX ORDER — BISMUTH SUBSALICYLATE 262 MG
1 TABLET,CHEWABLE ORAL DAILY
COMMUNITY

## 2020-11-11 NOTE — PROGRESS NOTES
[facility]    Mr. francis is 48 y.o. male with CAD, coronary stent, cardiomyopathy, COPD, history of substance abuse and other medical problem    Patient is here today for follow-up appointment. He denies any symptoms that is concerning for angina or heart failure. He is taking his medications regularly. He tells me that he is about to run out of his Coreg. He admits that he still does heroin on and off. Last time was about 5 days ago. He denies PND or any lower extremity swelling. Denies any nausea, vomiting, abdominal pain, fever, chills, sputum production. No hematuria or other bleeding complaints    Past Medical History:   Diagnosis Date    Asthma     CAD (coronary artery disease)     Cardiomy[facility]     Chronic lung disease     [email or web address]     Myocardial infarction ([facility] 06/24/2020    Stage 3 severe COPD by GOLD classification ([facility] 11/16/2019    Substance abuse (HCC)     Marijuana, heroin, ETOH, cocaine         No past surgical history on file. Current Outpatient Medications   Medication Sig    omega 3-DHA-EPA-fish oil (Fish Oil) 1,000 mg (120 mg-180 mg) capsule Take 1 Cap by mouth daily.  ubidecarenone (COQ-10 PO) Take  by mouth.  multivitamin (ONE A DAY) tablet Take 1 Tab by mouth daily.  albuterol (PROVENTIL HFA, VENTOLIN HFA, PROAIR HFA) 90 mcg/actuation inhaler Take 2 Puffs by inhalation every four (4) hours as needed for Wheezing.  mometasone-formoterol (Dulera) 100-5 mcg/actuation HFA inhaler Take 2 Puffs by inhalation two (2) times a day.  aspirin 81 mg chewable tablet Take 1 Tab by mouth daily.  atorvastatin (LIPITOR) 40 mg tablet Take 1 Tab by mouth nightly.  lisinopriL (PRINIVIL, ZESTRIL) 10 mg tablet Take 1 Tab by mouth daily. (Patient taking differently: Take 20 mg by mouth daily.)    spironolactone (ALDACTONE) 25 mg tablet Take 1 Tab by mouth daily.  ticagrelor (BRILINTA) 90 mg tablet Take 1 Tab by mouth two (2) times a day.  Nebulizer Accessories misc by [employer]. (Non-Drug; Combo Route) route.  albuterol (PROVENTIL VENTOLIN) 2.5 mg /3 mL (0.083 %) nebu Take 2.5 mg by inhalation.  tiotropium (SPIRIVA) 18 mcg inhalation capsule Take 1 Cap by inhalation daily.  albuterol (ACCUNEB) 1.25 mg/3 mL nebulizer solution 6 mL by Nebulization route every four (4) hours as needed for Wheezing or Shortness of Breath (wheezing). No current facility-administered medications for this visit. Allergies and Sensitivities:  Allergies   Allergen Reactions    Penicillins Rash       Family History:  No family history on file. Social History:  Social History     Tobacco Use    Smoking status: Former Smoker     Packs/day: 1.50     Years: 16.00     Pack years: 24.00     Types: Cigarettes     Start date: 1980     Last attempt to quit: 1994     Years since quittin.8    Smokeless tobacco: Former User    Tobacco comment: uses marijuana daily   Substance Use Topics    Alcohol use: No    Drug use: Not Currently     Types: Marijuana, Cocaine, Heroin     Comment: uses marijuana daily; last dose heroin 10/13/20     He  reports that he quit smoking about 26 years ago. His smoking use included cigarettes. He started smoking about 40 years ago. He has a 24.00 pack-year smoking history. He has quit using smokeless tobacco.  He  reports no history of alcohol use. Review of Systems:  Cardiac symptoms as noted above in HPI. All others negative. Denies fatigue, malaise, skin rash, joint pain, blurring vision, photophobia, neck pain, hemoptysis, chronic cough, nausea, vomiting, hematuria, burning micturition, BRBPR, chronic headaches.     Physical Exam:  BP Readings from Last 3 Encounters:   20 (!) 96/59   10/13/20 100/62   20 90/60         Pulse Readings from Last 3 Encounters:   20 76   10/13/20 (!) 49   20 76          Wt Readings from Last 3 Encounters:   11/11/20 136 lb (61.7 kg)   10/13/20 133 lb (60.3 kg)   10/08/20 125 lb (56.7 kg)       Constitutional: Oriented to person, place, and time. HENT: Head: Normocephalic and atraumatic. Neck: No JVD present. Cardiovascular: Regular rhythm. No murmur, gallop or rubs appreciated  Lung: Breath sounds normal. No respiratory distress. No ronchi or rales appreciated  Abdominal: No tenderness. No rebound and no guarding. Musculoskeletal: There is no lower extremity edema. No cynosis    Review of Data  LABS:   Lab Results   Component Value Date/Time    Sodium 136 10/13/2020 10:15 AM    Potassium 4.5 10/13/2020 10:15 AM    Chloride 104 10/13/2020 10:15 AM    CO2 28 10/13/2020 10:15 AM    Glucose 101 (H) 10/13/2020 10:15 AM    BUN 18 10/13/2020 10:15 AM    Creatinine 1.07 10/13/2020 10:15 AM     Lipids Latest Ref Rng & Units 10/5/2020   Chol, Total <200 MG/   HDL 40 - 60 MG/DL 66(H)   LDL 0 - 100 MG/DL 41.8   Trig <150 MG/DL 76   Chol/HDL Ratio 0 - 5.0   1.9   Some recent data might be hidden     Lab Results   Component Value Date/Time    ALT (SGPT) 42 10/13/2020 10:15 AM     No results found for: HBA1C, HGBE8, XVA6NQJC, LPG8VYZS, HBQ1TYRQ    EKG    ECHO (10/20)  LIMITED ECHO TO REASSESS EJECTION FRACTION. NORMAL LEFT VENTRICULAR CAVITY SIZE AND SYSTOLIC FUNCTION WITH EJECTION FRACTION 55%. NORMAL DIASTOLIC FUNCTION. MILD THICKENING/CALCIFICATION OF THE POSTERIOR MITRAL VALVE LEAFLET. MILD MITRAL REGURGITATION. TRACE TRICUSPID REGURGITATION. MILD AORTIC VALVE SCLEROSIS. ESTIMATED RIGHT VENTRICULAR SYSTOLIC PRESSURE IS NORMAL 22 MMHG. NO PERICARDIAL EFFUSION. COMPARED TO PREVIOUS ECHO PERFORMED ON 6-26-20, EF HAS IMPROVED FROM 74%, RV SYSTOLIC FUNCTION   WAS DESCRIBED AS LOW NORMAL FUNCTION.     CATHETERIZATION (06/20)  - Left main coronary artery: mild irregularities  - Left anterior descending coronary artery:  95% mid LAD stenosis  - Left circumflex coronary artery: mild irregularities  - Right coronary artery: dominant, mild (20-30%) diffuse disease in proximal/mid RCA.      Successful PCI of mid LAD with a 3.0 x 15 mm Orsiro SES (post-dilated with a 3.25 mm NC balloon)    Right atrium 9 ---   Right ventricle 40/10    Pulmonary artery 40/25 (31) 75.0%   Pulmonary wedge 32    Left ventricle 117/22    Aorta 104/78 98.0%    Cardiac output (liters/min) Cardiac index (l/min//m2)   Luna equation 4.82 2.82 1. Successful PCI of mid LAD with a 3.0 x 15 mm Orsiro SES   (post-dilated with a 3.25 mm NC balloon)    IMPRESSION & PLAN:  Mr. francis is 42-year-old male with multiple medical problem    CAD:  Non-STEMI in June 2020, status post mid LAD 3.0 x 15 mm ROBIN  Currently without any angina  Continue aspirin lifelong. Brilinta at least for a year  Continue beta-blocker, statin. Blocker dose was reduced by [place] cardiology specialist because of bradycardia. Continue Coreg 6.25 mg twice daily. 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  NYHA class II    Hypertension:  BP stable. Continue current medications    Hyperlipidemia:  Continue high intensity statin. No side effect at this time    History of substance abuse:  Currently using marijuana heroin. In the past he used to do cocaine. History of alcohol abuse. Have strongly advised against any substance abuse moving forward again during this visit    Patient was seen by [place] cardiology specialists in September 2020. I am good to ask him that he does not need to follow to cardiology team.  He was advised to follow-up with [place] cardiology team where his initial procedure and follow-up was done.     Thank you for allowing me to participate in patient care. Please feel free to call me if you have any question or concern. Chayo Mendoza MD  Please note: This document has been produced using voice recognition software. Unrecognized errors in transcription may be present.

## 2020-11-11 NOTE — PROGRESS NOTES
Priti Speaker presents today for   Chief Complaint   Patient presents with   [address] preferred language for health care discussion is english/other. Personal Protective Equipment:   Personal Protective Equipment was used including: mask-surgical and hands-gloves. Patient was placed on no precaution(s). Patient was masked. Is someone accompanying this pt? no    Is the patient using any DME equipment during [address]? no    Depression Screening:  3 most recent PHQ Screens 11/11/2020   Little interest or pleasure in doing things Not at all   Feeling down, depressed, irritable, or hopeless Not at all   Total Score PHQ 2 0   Trouble falling or staying asleep, or sleeping too much -   Feeling tired or having little energy -   Poor appetite, weight loss, or overeating -   Feeling bad about yourself - or that you are a failure or have let yourself or your family down -   Trouble concentrating on things such as school, work, reading, or watching TV -   Moving or speaking so slowly that other people could have noticed; or the opposite being so fidgety that others notice -   Thoughts of being better off dead, or hurting yourself in some way -   PHQ 9 Score -   How difficult have these problems made it for you to do your work, take care of your home and get along with others -       Learning Assessment:  Learning Assessment 7/16/2020   PRIMARY LEARNER Patient   HIGHEST LEVEL OF EDUCATION - PRIMARY LEARNER  2 YEARS OF COLLEGE   BARRIERS PRIMARY LEARNER NONE   CO-LEARNER CAREGIVER No   PRIMARY LANGUAGE ENGLISH   LEARNER PREFERENCE PRIMARY READING   ANSWERED BY Ainsley Cyr   RELATIONSHIP SELF       Abuse Screening:  No flowsheet data found. Fall Risk  Fall Risk Assessment, last 12 mths 7/16/2020   Able to walk? Yes   Fall in past 12 months? No       Pt currently taking Anticoagulant therapy? no    Coordination of Care:  1. Have you been to the ER, urgent care clinic since your last visit?  Hospitalized since your last visit? yes    2. Have you seen or consulted any other health care providers outside of the [address] [place] [address] since your last visit? Include any pap smears or colon screening.  no

## 2020-11-16 ENCOUNTER — TELEPHONE (OUTPATIENT)
Dept: PULMONOLOGY | Age: 50
End: 2020-11-16

## 2020-11-16 RX ORDER — ALBUTEROL SULFATE 1.25 MG/3ML
2.5 SOLUTION RESPIRATORY (INHALATION)
Qty: 25 EACH | Refills: 3 | Status: SHIPPED | OUTPATIENT
Start: 2020-11-16 | End: 2021-01-13 | Stop reason: SDUPTHER

## 2020-12-01 ENCOUNTER — HOSPITAL ENCOUNTER (OUTPATIENT)
Age: 50
Setting detail: OBSERVATION
Discharge: HOME | End: 2020-12-02
Attending: EMERGENCY MEDICINE | Admitting: HOSPITALIST
Payer: COMMERCIAL

## 2020-12-01 ENCOUNTER — APPOINTMENT (OUTPATIENT)
Dept: CT IMAGING | Age: 50
End: 2020-12-01
Attending: EMERGENCY MEDICINE
Payer: COMMERCIAL

## 2020-12-01 ENCOUNTER — APPOINTMENT (OUTPATIENT)
Dept: GENERAL RADIOLOGY | Age: 50
End: 2020-12-01
Attending: EMERGENCY MEDICINE
Payer: COMMERCIAL

## 2020-12-01 DIAGNOSIS — R09.02 HYPOXIA: ICD-10-CM

## 2020-12-01 DIAGNOSIS — J44.1 COPD EXACERBATION (HCC): ICD-10-CM

## 2020-12-01 DIAGNOSIS — J96.01 ACUTE RESPIRATORY FAILURE WITH HYPOXIA (HCC): Primary | ICD-10-CM

## 2020-12-01 LAB
ALBUMIN SERPL-MCNC: 3.8 G/DL (ref 3.4–5)
ALBUMIN/GLOB SERPL: 1.1 {RATIO} (ref 0.8–1.7)
ALP SERPL-CCNC: 64 U/L (ref 45–117)
ALT SERPL-CCNC: 44 U/L (ref 16–61)
ANION GAP SERPL CALC-SCNC: 4 MMOL/L (ref 3–18)
AST SERPL-CCNC: 18 U/L (ref 10–38)
BASOPHILS # BLD: 0 K/UL (ref 0–0.1)
BASOPHILS NFR BLD: 0 % (ref 0–2)
BILIRUB SERPL-MCNC: 1.3 MG/DL (ref 0.2–1)
BNP SERPL-MCNC: 125 PG/ML (ref 0–900)
BUN SERPL-MCNC: 17 MG/DL (ref 7–18)
BUN/CREAT SERPL: 21 (ref 12–20)
CALCIUM SERPL-MCNC: 9.4 MG/DL (ref 8.5–10.1)
CHLORIDE SERPL-SCNC: 99 MMOL/L (ref 100–111)
CK MB CFR SERPL CALC: 2.1 % (ref 0–4)
CK MB SERPL-MCNC: 1.3 NG/ML (ref 5–25)
CK SERPL-CCNC: 61 U/L (ref 39–308)
CO2 SERPL-SCNC: 29 MMOL/L (ref 21–32)
COVID-19 RAPID TEST, COVR: NOT DETECTED
CREAT SERPL-MCNC: 0.82 MG/DL (ref 0.6–1.3)
D DIMER PPP FEU-MCNC: 0.34 UG/ML(FEU)
DIFFERENTIAL METHOD BLD: ABNORMAL
EOSINOPHIL # BLD: 0.7 K/UL (ref 0–0.4)
EOSINOPHIL NFR BLD: 4 % (ref 0–5)
ERYTHROCYTE [DISTWIDTH] IN BLOOD BY AUTOMATED COUNT: 12.1 % (ref 11.6–14.5)
GLOBULIN SER CALC-MCNC: 3.5 G/DL (ref 2–4)
GLUCOSE SERPL-MCNC: 107 MG/DL (ref 74–99)
HCT VFR BLD AUTO: 47.3 % (ref 36–48)
HEALTH STATUS, XMCV2T: NORMAL
HGB BLD-MCNC: 15.4 G/DL (ref 13–16)
LACTATE BLD-SCNC: 0.96 MMOL/L (ref 0.4–2)
LYMPHOCYTES # BLD: 3.8 K/UL (ref 0.9–3.6)
LYMPHOCYTES NFR BLD: 25 % (ref 21–52)
MAGNESIUM SERPL-MCNC: 3 MG/DL (ref 1.6–2.6)
MCH RBC QN AUTO: 31.8 PG (ref 24–34)
MCHC RBC AUTO-ENTMCNC: 32.6 G/DL (ref 31–37)
MCV RBC AUTO: 97.5 FL (ref 74–97)
MONOCYTES # BLD: 0.8 K/UL (ref 0.05–1.2)
MONOCYTES NFR BLD: 5 % (ref 3–10)
NEUTS SEG # BLD: 10.2 K/UL (ref 1.8–8)
NEUTS SEG NFR BLD: 66 % (ref 40–73)
PLATELET # BLD AUTO: 325 K/UL (ref 135–420)
PMV BLD AUTO: 9.8 FL (ref 9.2–11.8)
POTASSIUM SERPL-SCNC: 4.6 MMOL/L (ref 3.5–5.5)
PROT SERPL-MCNC: 7.3 G/DL (ref 6.4–8.2)
RBC # BLD AUTO: 4.85 M/UL (ref 4.7–5.5)
SODIUM SERPL-SCNC: 132 MMOL/L (ref 136–145)
SOURCE, COVRS: NORMAL
SPECIMEN TYPE, XMCV1T: NORMAL
TROPONIN I SERPL-MCNC: <0.02 NG/ML (ref 0–0.04)
WBC # BLD AUTO: 15.6 K/UL (ref 4.6–13.2)

## 2020-12-01 PROCEDURE — 99285 EMERGENCY DEPT VISIT HI MDM: CPT

## 2020-12-01 PROCEDURE — 83735 ASSAY OF MAGNESIUM: CPT

## 2020-12-01 PROCEDURE — 99218 HC RM OBSERVATION: CPT

## 2020-12-01 PROCEDURE — 74011000258 HC RX REV CODE- 258: Performed by: EMERGENCY MEDICINE

## 2020-12-01 PROCEDURE — 80053 COMPREHEN METABOLIC PANEL: CPT

## 2020-12-01 PROCEDURE — 74011000250 HC RX REV CODE- 250: Performed by: EMERGENCY MEDICINE

## 2020-12-01 PROCEDURE — 87040 BLOOD CULTURE FOR BACTERIA: CPT

## 2020-12-01 PROCEDURE — 82550 ASSAY OF CK (CPK): CPT

## 2020-12-01 PROCEDURE — 74011250636 HC RX REV CODE- 250/636: Performed by: EMERGENCY MEDICINE

## 2020-12-01 PROCEDURE — 83880 ASSAY OF NATRIURETIC PEPTIDE: CPT

## 2020-12-01 PROCEDURE — 74011000636 HC RX REV CODE- 636: Performed by: EMERGENCY MEDICINE

## 2020-12-01 PROCEDURE — 65660000000 HC RM CCU STEPDOWN

## 2020-12-01 PROCEDURE — 94640 AIRWAY INHALATION TREATMENT: CPT

## 2020-12-01 PROCEDURE — 93005 ELECTROCARDIOGRAM TRACING: CPT

## 2020-12-01 PROCEDURE — 96365 THER/PROPH/DIAG IV INF INIT: CPT

## 2020-12-01 PROCEDURE — 65270000029 HC RM PRIVATE

## 2020-12-01 PROCEDURE — 85025 COMPLETE CBC W/AUTO DIFF WBC: CPT

## 2020-12-01 PROCEDURE — 71275 CT ANGIOGRAPHY CHEST: CPT

## 2020-12-01 PROCEDURE — 71045 X-RAY EXAM CHEST 1 VIEW: CPT

## 2020-12-01 PROCEDURE — 83605 ASSAY OF LACTIC ACID: CPT

## 2020-12-01 PROCEDURE — 85379 FIBRIN DEGRADATION QUANT: CPT

## 2020-12-01 PROCEDURE — 87635 SARS-COV-2 COVID-19 AMP PRB: CPT

## 2020-12-01 RX ORDER — SODIUM CHLORIDE 0.9 % (FLUSH) 0.9 %
5-40 SYRINGE (ML) INJECTION AS NEEDED
Status: DISCONTINUED | OUTPATIENT
Start: 2020-12-01 | End: 2020-12-02 | Stop reason: HOSPADM

## 2020-12-01 RX ORDER — LEVOFLOXACIN 5 MG/ML
750 INJECTION, SOLUTION INTRAVENOUS ONCE
Status: COMPLETED | OUTPATIENT
Start: 2020-12-01 | End: 2020-12-01

## 2020-12-01 RX ORDER — SODIUM CHLORIDE 0.9 % (FLUSH) 0.9 %
5-40 SYRINGE (ML) INJECTION EVERY 8 HOURS
Status: DISCONTINUED | OUTPATIENT
Start: 2020-12-01 | End: 2020-12-02 | Stop reason: HOSPADM

## 2020-12-01 RX ORDER — IPRATROPIUM BROMIDE AND ALBUTEROL SULFATE 2.5; .5 MG/3ML; MG/3ML
3 SOLUTION RESPIRATORY (INHALATION)
Status: COMPLETED | OUTPATIENT
Start: 2020-12-01 | End: 2020-12-01

## 2020-12-01 RX ORDER — SODIUM CHLORIDE 9 MG/ML
100 INJECTION, SOLUTION INTRAVENOUS ONCE
Status: COMPLETED | OUTPATIENT
Start: 2020-12-01 | End: 2020-12-01

## 2020-12-01 RX ADMIN — SODIUM CHLORIDE 94 ML: 900 INJECTION, SOLUTION INTRAVENOUS at 20:57

## 2020-12-01 RX ADMIN — IPRATROPIUM BROMIDE AND ALBUTEROL SULFATE 3 ML: .5; 3 SOLUTION RESPIRATORY (INHALATION) at 18:13

## 2020-12-01 RX ADMIN — Medication 10 ML: at 22:00

## 2020-12-01 RX ADMIN — IOPAMIDOL 80 ML: 755 INJECTION, SOLUTION INTRAVENOUS at 20:56

## 2020-12-01 RX ADMIN — LEVOFLOXACIN 750 MG: 5 INJECTION, SOLUTION INTRAVENOUS at 20:18

## 2020-12-01 NOTE — ED NOTES
Pt dorene avila updated [phone], on pt condition. She would like to be notified if pt end up being admitted to ED.

## 2020-12-01 NOTE — ED NOTES
Pt state he is feeling better taken off Bi-pap by Resp 100% on 2L resp 20. No acute signs of distress. Pt does not normally use oxygen at home. Will continue to monitor.

## 2020-12-02 VITALS
TEMPERATURE: 98.3 F | DIASTOLIC BLOOD PRESSURE: 79 MMHG | OXYGEN SATURATION: 93 % | SYSTOLIC BLOOD PRESSURE: 124 MMHG | RESPIRATION RATE: 19 BRPM | HEART RATE: 78 BPM

## 2020-12-02 LAB
ANION GAP SERPL CALC-SCNC: 8 MMOL/L (ref 3–18)
ATRIAL RATE: 102 BPM
BUN SERPL-MCNC: 17 MG/DL (ref 7–18)
BUN/CREAT SERPL: 22 (ref 12–20)
CALCIUM SERPL-MCNC: 8.9 MG/DL (ref 8.5–10.1)
CALCULATED P AXIS, ECG09: 80 DEGREES
CALCULATED R AXIS, ECG10: 53 DEGREES
CALCULATED T AXIS, ECG11: 74 DEGREES
CHLORIDE SERPL-SCNC: 104 MMOL/L (ref 100–111)
CO2 SERPL-SCNC: 24 MMOL/L (ref 21–32)
CREAT SERPL-MCNC: 0.77 MG/DL (ref 0.6–1.3)
DIAGNOSIS, 93000: NORMAL
ERYTHROCYTE [DISTWIDTH] IN BLOOD BY AUTOMATED COUNT: 12 % (ref 11.6–14.5)
GLUCOSE SERPL-MCNC: 145 MG/DL (ref 74–99)
HCT VFR BLD AUTO: 40.4 % (ref 36–48)
HGB BLD-MCNC: 13.5 G/DL (ref 13–16)
MCH RBC QN AUTO: 32.4 PG (ref 24–34)
MCHC RBC AUTO-ENTMCNC: 33.4 G/DL (ref 31–37)
MCV RBC AUTO: 96.9 FL (ref 74–97)
P-R INTERVAL, ECG05: 202 MS
PLATELET # BLD AUTO: 312 K/UL (ref 135–420)
PMV BLD AUTO: 9.6 FL (ref 9.2–11.8)
POTASSIUM SERPL-SCNC: 4.4 MMOL/L (ref 3.5–5.5)
Q-T INTERVAL, ECG07: 338 MS
QRS DURATION, ECG06: 82 MS
QTC CALCULATION (BEZET), ECG08: 440 MS
RBC # BLD AUTO: 4.17 M/UL (ref 4.7–5.5)
SODIUM SERPL-SCNC: 136 MMOL/L (ref 136–145)
VENTRICULAR RATE, ECG03: 102 BPM
WBC # BLD AUTO: 8.4 K/UL (ref 4.6–13.2)

## 2020-12-02 PROCEDURE — 96375 TX/PRO/DX INJ NEW DRUG ADDON: CPT

## 2020-12-02 PROCEDURE — 96372 THER/PROPH/DIAG INJ SC/IM: CPT

## 2020-12-02 PROCEDURE — 85027 COMPLETE CBC AUTOMATED: CPT

## 2020-12-02 PROCEDURE — 74011250636 HC RX REV CODE- 250/636: Performed by: HOSPITALIST

## 2020-12-02 PROCEDURE — 80048 BASIC METABOLIC PNL TOTAL CA: CPT

## 2020-12-02 PROCEDURE — 74011250637 HC RX REV CODE- 250/637: Performed by: HOSPITALIST

## 2020-12-02 PROCEDURE — 74011000250 HC RX REV CODE- 250: Performed by: HOSPITALIST

## 2020-12-02 PROCEDURE — 96376 TX/PRO/DX INJ SAME DRUG ADON: CPT

## 2020-12-02 PROCEDURE — 99218 HC RM OBSERVATION: CPT

## 2020-12-02 RX ORDER — SODIUM CHLORIDE 0.9 % (FLUSH) 0.9 %
5-40 SYRINGE (ML) INJECTION AS NEEDED
Status: DISCONTINUED | OUTPATIENT
Start: 2020-12-02 | End: 2020-12-02 | Stop reason: HOSPADM

## 2020-12-02 RX ORDER — GUAIFENESIN 100 MG/5ML
81 LIQUID (ML) ORAL DAILY
Status: DISCONTINUED | OUTPATIENT
Start: 2020-12-02 | End: 2020-12-02 | Stop reason: HOSPADM

## 2020-12-02 RX ORDER — POLYETHYLENE GLYCOL 3350 17 G/17G
17 POWDER, FOR SOLUTION ORAL DAILY PRN
Status: DISCONTINUED | OUTPATIENT
Start: 2020-12-02 | End: 2020-12-02 | Stop reason: HOSPADM

## 2020-12-02 RX ORDER — ACETAMINOPHEN 650 MG/1
650 SUPPOSITORY RECTAL
Status: DISCONTINUED | OUTPATIENT
Start: 2020-12-02 | End: 2020-12-02 | Stop reason: HOSPADM

## 2020-12-02 RX ORDER — PREDNISONE 10 MG/1
TABLET ORAL
Qty: 21 TAB | Refills: 0 | Status: SHIPPED | OUTPATIENT
Start: 2020-12-02

## 2020-12-02 RX ORDER — ACETAMINOPHEN 325 MG/1
650 TABLET ORAL
Status: DISCONTINUED | OUTPATIENT
Start: 2020-12-02 | End: 2020-12-02 | Stop reason: HOSPADM

## 2020-12-02 RX ORDER — THERA TABS 400 MCG
1 TAB ORAL DAILY
Status: DISCONTINUED | OUTPATIENT
Start: 2020-12-02 | End: 2020-12-02 | Stop reason: HOSPADM

## 2020-12-02 RX ORDER — LISINOPRIL 20 MG/1
20 TABLET ORAL DAILY
Status: DISCONTINUED | OUTPATIENT
Start: 2020-12-02 | End: 2020-12-02 | Stop reason: HOSPADM

## 2020-12-02 RX ORDER — SPIRONOLACTONE 25 MG/1
25 TABLET ORAL DAILY
Status: DISCONTINUED | OUTPATIENT
Start: 2020-12-02 | End: 2020-12-02 | Stop reason: HOSPADM

## 2020-12-02 RX ORDER — CARVEDILOL 12.5 MG/1
6.25 TABLET ORAL 2 TIMES DAILY WITH MEALS
Status: DISCONTINUED | OUTPATIENT
Start: 2020-12-02 | End: 2020-12-02 | Stop reason: HOSPADM

## 2020-12-02 RX ORDER — AZITHROMYCIN 250 MG/1
250 TABLET, FILM COATED ORAL DAILY
Qty: 4 TAB | Refills: 0 | Status: SHIPPED | OUTPATIENT
Start: 2020-12-02 | End: 2021-01-13 | Stop reason: ALTCHOICE

## 2020-12-02 RX ORDER — PROMETHAZINE HYDROCHLORIDE 25 MG/1
12.5 TABLET ORAL
Status: DISCONTINUED | OUTPATIENT
Start: 2020-12-02 | End: 2020-12-02 | Stop reason: HOSPADM

## 2020-12-02 RX ORDER — SODIUM CHLORIDE 0.9 % (FLUSH) 0.9 %
5-40 SYRINGE (ML) INJECTION EVERY 8 HOURS
Status: DISCONTINUED | OUTPATIENT
Start: 2020-12-02 | End: 2020-12-02 | Stop reason: HOSPADM

## 2020-12-02 RX ORDER — AZITHROMYCIN 100 MG/ML
INJECTION, POWDER, LYOPHILIZED, FOR SOLUTION INTRAVENOUS
Status: DISCONTINUED
Start: 2020-12-02 | End: 2020-12-02 | Stop reason: HOSPADM

## 2020-12-02 RX ORDER — ALBUTEROL SULFATE 0.83 MG/ML
2.5 SOLUTION RESPIRATORY (INHALATION)
Status: DISCONTINUED | OUTPATIENT
Start: 2020-12-02 | End: 2020-12-02 | Stop reason: HOSPADM

## 2020-12-02 RX ORDER — ATORVASTATIN CALCIUM 20 MG/1
40 TABLET, FILM COATED ORAL
Status: DISCONTINUED | OUTPATIENT
Start: 2020-12-02 | End: 2020-12-02 | Stop reason: HOSPADM

## 2020-12-02 RX ORDER — ONDANSETRON 2 MG/ML
4 INJECTION INTRAMUSCULAR; INTRAVENOUS
Status: DISCONTINUED | OUTPATIENT
Start: 2020-12-02 | End: 2020-12-02 | Stop reason: HOSPADM

## 2020-12-02 RX ORDER — ENOXAPARIN SODIUM 100 MG/ML
40 INJECTION SUBCUTANEOUS DAILY
Status: DISCONTINUED | OUTPATIENT
Start: 2020-12-02 | End: 2020-12-02 | Stop reason: HOSPADM

## 2020-12-02 RX ADMIN — THERA TABS 1 TABLET: TAB at 08:19

## 2020-12-02 RX ADMIN — LISINOPRIL 20 MG: 20 TABLET ORAL at 08:18

## 2020-12-02 RX ADMIN — AZITHROMYCIN MONOHYDRATE 500 MG: 500 INJECTION, POWDER, LYOPHILIZED, FOR SOLUTION INTRAVENOUS at 02:00

## 2020-12-02 RX ADMIN — ASPIRIN 81 MG: 81 TABLET, CHEWABLE ORAL at 08:19

## 2020-12-02 RX ADMIN — CARVEDILOL 6.25 MG: 12.5 TABLET, FILM COATED ORAL at 08:19

## 2020-12-02 RX ADMIN — ALBUTEROL SULFATE 2.5 MG: 2.5 SOLUTION RESPIRATORY (INHALATION) at 12:09

## 2020-12-02 RX ADMIN — METHYLPREDNISOLONE SODIUM SUCCINATE 40 MG: 40 INJECTION, POWDER, FOR SOLUTION INTRAMUSCULAR; INTRAVENOUS at 01:28

## 2020-12-02 RX ADMIN — ALBUTEROL SULFATE 2.5 MG: 2.5 SOLUTION RESPIRATORY (INHALATION) at 07:44

## 2020-12-02 RX ADMIN — SPIRONOLACTONE 25 MG: 25 TABLET ORAL at 08:19

## 2020-12-02 RX ADMIN — ENOXAPARIN SODIUM 40 MG: 40 INJECTION SUBCUTANEOUS at 08:18

## 2020-12-02 RX ADMIN — METHYLPREDNISOLONE SODIUM SUCCINATE 40 MG: 40 INJECTION, POWDER, FOR SOLUTION INTRAMUSCULAR; INTRAVENOUS at 08:53

## 2020-12-02 RX ADMIN — TICAGRELOR 90 MG: 90 TABLET ORAL at 08:30

## 2020-12-02 RX ADMIN — Medication 10 ML: at 08:32

## 2020-12-02 NOTE — ED NOTES
Patient sitting with HOB elevated on stretcher. Patient states feels that chest is getting tighter- neb treatment. Patient able to talk in full sentences.

## 2020-12-02 NOTE — ED NOTES
Provider Hina at bedside to see pt. Plans being made to d/c pt to home. Will make charge nurse aware that inpatient bed is not needed.

## 2020-12-02 NOTE — ED NOTES
Report received from [facility]. Assumed care of pt at this time. Pt resting in bed, with no acute signs of distress.

## 2020-12-02 NOTE — ED PROVIDER NOTES
The patient is a 51-year-old male with past medical history significant for coronary artery disease status post MI with congestive heart failure along with COPD, who presents to the ED in acute respiratory distress. He was brought in by EMS on CPAP. When respiratory therapy evaluated him, he stated that he was breathing much better and he was taken off of the CPAP and put on 2 L of oxygen via nasal cannula. He states that he has had difficulty breathing for the past several days and his chest has been getting tighter and tighter. He denies any fevers or chills. He denies any cough. He states that he has had slight chest pain since he had his MI in 2020. He states that he is not currently on any home oxygen but he does do albuterol Atrovent neb treatments at home. Past Medical History:   Diagnosis Date    Asthma     CAD (coronary artery disease)     Cardiomyopathy ([facility]     LVEf 20% () 50%(10/20)     Chronic obstructive pulmonary disease (HCC)     Myocardial infarction ([facility] 2020    Stage 3 severe COPD by GOLD classification ([facility] 2019    Substance abuse ([facility]     Marijuana, heroin, ETOH, cocaine       History reviewed. No pertinent surgical history. History reviewed. No pertinent family history.     Social History     Socioeconomic History    Marital status: SINGLE     Spouse name: Not on file    Number of children: Not on file    Years of education: Not on file    Highest education level: Not on file   Occupational History    Not on file   Social Needs    Financial resource strain: Not on file    Food insecurity     Worry: Not on file     Inability: Not on file    Transportation needs     Medical: Not on file     Non-medical: Not on file   Tobacco Use    Smoking status: Former Smoker     Packs/day: 1.50     Years: 16.00     Pack years: 24.00     Types: Cigarettes     Start date: 1980     Last attempt to quit: 1994     Years since quittin.9  Smokeless tobacco: Former User    Tobacco comment: uses marijuana daily   Substance and Sexual Activity    Alcohol use: No    Drug use: Not Currently     Types: Marijuana, Cocaine, Heroin     Comment: uses marijuana daily; last dose heroin 10/13/20    Sexual activity: Yes     Partners: Female   Lifestyle    Physical activity     Days per week: Not on file     Minutes per session: Not on file    Stress: Not on file   Relationships    Social connections     Talks on phone: Not on file     Gets together: Not on file     Attends Tenriism service: Not on file     Active member of club or organization: Not on file     Attends meetings of clubs or organizations: Not on file     Relationship status: Not on file    Intimate partner violence     Fear of current or ex partner: Not on file     Emotionally abused: Not on file     Physically abused: Not on file     Forced sexual activity: Not on file   Other Topics Concern    Not on file   Social History Narrative    Not on file         ALLERGIES: Penicillins    Review of Systems   Constitutional: Negative. HENT: Negative. Eyes: Negative. Respiratory: Positive for chest tightness, shortness of breath and wheezing. Negative for apnea, cough, choking and stridor. Cardiovascular: Positive for chest pain. Negative for palpitations and leg swelling. Gastrointestinal: Negative. Endocrine: Negative. Genitourinary: Negative. Musculoskeletal: Negative. Skin: Negative. Allergic/Immunologic: Negative. Neurological: Negative. Hematological: Negative. Psychiatric/Behavioral: Negative. Vitals:    12/01/20 1740   BP: (!) 152/103   Pulse: (!) 107   Resp: 18   Temp: 98.3 °F (36.8 °C)   SpO2: 100%            Physical Exam  Vitals signs and nursing note reviewed. Constitutional:       Appearance: Normal appearance. He is normal weight. HENT:      Head: Normocephalic and atraumatic.       Nose: Nose normal.      Mouth/Throat:      Mouth: Mucous membranes are moist.   Eyes:      Extraocular Movements: Extraocular movements intact. Conjunctiva/sclera: Conjunctivae normal.      Pupils: Pupils are equal, round, and reactive to light. Neck:      Musculoskeletal: Normal range of motion and neck supple. Cardiovascular:      Rate and Rhythm: Regular rhythm. Tachycardia present. Pulses: Normal pulses. Heart sounds: Normal heart sounds. Pulmonary:      Breath sounds: Wheezing and rhonchi present. Abdominal:      General: Abdomen is flat. Bowel sounds are normal.      Palpations: Abdomen is soft. Musculoskeletal: Normal range of motion. Skin:     General: Skin is warm and dry. Capillary Refill: Capillary refill takes 2 to 3 seconds. Neurological:      General: No focal deficit present. Mental Status: He is alert and oriented to person, place, and time. Psychiatric:         Mood and Affect: Mood normal.         Behavior: Behavior normal.        Recent Results (from the past 12 hour(s))   CBC WITH AUTOMATED DIFF    Collection Time: 12/01/20  5:44 PM   Result Value Ref Range    WBC 15.6 (H) 4.6 - 13.2 K/uL    RBC 4.85 4.70 - 5.50 M/uL    HGB 15.4 13.0 - 16.0 g/dL    HCT 47.3 36.0 - 48.0 %    MCV 97.5 (H) 74.0 - 97.0 FL    MCH 31.8 24.0 - 34.0 PG    MCHC 32.6 31.0 - 37.0 g/dL    RDW 12.1 11.6 - 14.5 %    PLATELET 225 819 - 917 K/uL    MPV 9.8 9.2 - 11.8 FL    NEUTROPHILS 66 40 - 73 %    LYMPHOCYTES 25 21 - 52 %    MONOCYTES 5 3 - 10 %    EOSINOPHILS 4 0 - 5 %    BASOPHILS 0 0 - 2 %    ABS. NEUTROPHILS 10.2 (H) 1.8 - 8.0 K/UL    ABS. LYMPHOCYTES 3.8 (H) 0.9 - 3.6 K/UL    ABS. MONOCYTES 0.8 0.05 - 1.2 K/UL    ABS. EOSINOPHILS 0.7 (H) 0.0 - 0.4 K/UL    ABS.  BASOPHILS 0.0 0.0 - 0.1 K/UL    DF AUTOMATED     EKG, 12 LEAD, INITIAL    Collection Time: 12/01/20  5:46 PM   Result Value Ref Range    Ventricular Rate 102 BPM    Atrial Rate 102 BPM    P-R Interval 202 ms    QRS Duration 82 ms    Q-T Interval 338 ms    QTC Calculation (Bezet) 440 ms    Calculated P Axis 80 degrees    Calculated R Axis 53 degrees    Calculated T Axis 74 degrees    Diagnosis       Sinus tachycardia  Possible Left atrial enlargement  Borderline ECG  When compared with ECG of 13-OCT-2020 10:25,  VT interval has decreased  Vent. rate has increased BY  62 BPM  QT has lengthened     CARDIAC PANEL,(CK, CKMB & TROPONIN)    Collection Time: 12/01/20  6:44 PM   Result Value Ref Range    CK - MB 1.3 <3.6 ng/ml    CK-MB Index 2.1 0.0 - 4.0 %    CK 61 39 - 308 U/L    Troponin-I, QT <0.02 0.0 - 7.876 NG/ML   METABOLIC PANEL, COMPREHENSIVE    Collection Time: 12/01/20  6:44 PM   Result Value Ref Range    Sodium 132 (L) 136 - 145 mmol/L    Potassium 4.6 3.5 - 5.5 mmol/L    Chloride 99 (L) 100 - 111 mmol/L    CO2 29 21 - 32 mmol/L    Anion gap 4 3.0 - 18 mmol/L    Glucose 107 (H) 74 - 99 mg/dL    BUN 17 7.0 - 18 MG/DL    Creatinine 0.82 0.6 - 1.3 MG/DL    BUN/Creatinine ratio 21 (H) 12 - 20      GFR est AA >60 >60 ml/min/1.73m2    GFR est non-AA >60 >60 ml/min/1.73m2    Calcium 9.4 8.5 - 10.1 MG/DL    Bilirubin, total 1.3 (H) 0.2 - 1.0 MG/DL    ALT (SGPT) 44 16 - 61 U/L    AST (SGOT) 18 10 - 38 U/L    Alk. phosphatase 64 45 - 117 U/L    Protein, total 7.3 6.4 - 8.2 g/dL    Albumin 3.8 3.4 - 5.0 g/dL    Globulin 3.5 2.0 - 4.0 g/dL    A-G Ratio 1.1 0.8 - 1.7     NT-PRO BNP    Collection Time: 12/01/20  6:49 PM   Result Value Ref Range    NT pro- 0 - 900 PG/ML   MAGNESIUM    Collection Time: 12/01/20  6:49 PM   Result Value Ref Range    Magnesium 3.0 (H) 1.6 - 2.6 mg/dL   SARS-COV-2    Collection Time: 12/01/20  6:49 PM   Result Value Ref Range    Specimen source NP SWAB     COVID-19 rapid test Not detected NOTD      Specimen type NP Swab      Health status NP Swab     POC LACTIC ACID    Collection Time: 12/01/20  8:12 PM   Result Value Ref Range    Lactic Acid (POC) 0.96 0.40 - 2.00 mmol/L     CTA CHEST W OR W WO CONT   Final Result   IMPRESSION:      1.   No evidence of pulmonary embolism. 2.  Mild right middle lobe and lingular atelectasis. 3. Mild chronic ectasia of the ascending thoracic aorta. A preliminary report was provided by the radiology resident on call at the time   of the study. XR CHEST PORT   Final Result   IMPRESSION:      No active cardiopulmonary disease. MDM  Number of Diagnoses or Management Options  Acute respiratory failure with hypoxia [facility]):   COPD exacerbation ([address]): Hypoxia:   Diagnosis management comments: The patient is a 77-year-old male who came to the ED in acute respiratory distress today. He has a history of CHF and COPD. On exam he has some wheezing throughout all lung fields. He has received 1 albuterol and Atrovent nebulizer treatment. I also ordered 750 mg of Levaquin to cover for a potential PNA and/or COPD exacerbation. He initially came in on CPAP and is now on 2L NC and resting comfortably. Discussed the patient with the hospitalist, Dr. John Paul Westfall. He is requesting that we get a rapid Covid and a CTA of the chest and once those are complete he will accept the patient on his service.          Critical Care  Performed by: Adrian Wood MD  Authorized by: Adrian Wood MD     Critical care provider statement:     Critical care time (minutes):  40    Critical care was necessary to treat or prevent imminent or life-threatening deterioration of the following conditions:  Cardiac failure, CNS failure or compromise and respiratory failure    Critical care was time spent personally by me on the following activities:  Development of treatment plan with patient or surrogate, discussions with consultants, evaluation of patient's response to treatment, examination of patient, obtaining history from patient or surrogate, ordering and performing treatments and interventions, ordering and review of laboratory studies, ordering and review of radiographic studies, pulse oximetry, re-evaluation of patient's condition and review of old charts    I assumed direction of critical care for this patient from another provider in my specialty: no

## 2020-12-02 NOTE — ED NOTES
I have reviewed discharge instructions with the patient. The patient verbalized understanding. Given two RX. VSS. No acute signs of distress at this time. Pt walked to [facility] and d/c to home.

## 2020-12-02 NOTE — ED NOTES
Patient states feel more comfortable- denies any acute distress. Patient updated on status -patient to be admitted. Pateint verbalized good understanding.

## 2020-12-02 NOTE — ED NOTES
Per pt request perfect served Provider Sarah Pierre. Pt asking to be d/c to home.   Will wait for response,

## 2020-12-02 NOTE — H&P
Medicine History and Physical    Patient: Katerin Carranza   Age:  48 y.o. Assessment   Principal Problem:    Acute on chronic respiratory failure with hypoxia ([facility] (11/1/2014)    Active Problems:    High blood pressure (11/1/2014)      COPD exacerbation (HCC) (12/1/2020)          Plan     Acute on chronic resp failure with hypoxia, copd exacerbation   - nebs, steriods, tele monitor, azithromycin, cta pending    Home meds for chronic conditions. DISPO  -Pt to be admitted  at this time for reasons addressed above, continued hospitalization for ongoing assessment and treatment indicated     Anticipated Date of Discharge: 2 days  Anticipated Disposition (home, SNF) : home    Chief Complaint:   Chief Complaint   Patient presents with    Respiratory Distress         HPI:   Katerin Carranza is a 48y.o. year old male who presents with  SOB. Patient states he has been SOB for several days. He got acutely worse , came in resp distress requiring bipap. He was quickly able to decrease to 2 L nasal canula. He does not express fever, chills, N/V or productive cough. He is NOT on home 02. He follows with a pulm and cardiologist.  He had an MI in June and states since then he has been trying to live \" a different life style\"      Review of Systems - positive responses in bold type   Constitutional: Negative for fever, chills, diaphoresis and unexpected weight change. HENT: Negative for ear pain, congestion, sore throat, rhinorrhea, drooling, trouble swallowing, neck pain and tinnitus. Eyes: Negative for photophobia, pain, redness and visual disturbance. Respiratory: negative for shortness of breath, cough, choking, chest tightness, wheezing or stridor. Cardiovascular: Negative for chest pain, palpitations and leg swelling. Gastrointestinal: Negative for nausea, vomiting, abdominal pain, diarrhea, constipation, blood in stool, abdominal distention and anal bleeding.    Genitourinary: Negative for dysuria, urgency, frequency, hematuria, flank pain and difficulty urinating. Musculoskeletal: Negative for back pain and arthralgias. Skin: Negative for color change, rash and wound. Neurological: Negative for dizziness, seizures, syncope, speech difficulty, light-headedness or headaches. Hematological: Does not bruise/bleed easily. Psychiatric/Behavioral: Negative for suicidal ideas, hallucinations, behavioral problems, self-injury or agitation       Past Medical History:  Past Medical History:   Diagnosis Date    Asthma     CAD (coronary artery disease)     Cardiomyopathy ([facility]     LVEf 20% () 50%(10/20)     Chronic obstructive pulmonary disease (HCC)     Myocardial infarction ([facility] 2020    Stage 3 severe COPD by GOLD classification ([facility] 2019    Substance abuse ([place] [address]     Marijuana, heroin, ETOH, cocaine       Past Surgical History:  History reviewed. No pertinent surgical history. Family History:  History reviewed. No pertinent family history. Social History:  Social History     Socioeconomic History    Marital status: SINGLE     Spouse name: Not on file    Number of children: Not on file    Years of education: Not on file    Highest education level: Not on file   Tobacco Use    Smoking status: Former Smoker     Packs/day: 1.50     Years: 16.00     Pack years: 24.00     Types: Cigarettes     Start date: 1980     Last attempt to quit: 1994     Years since quittin.9    Smokeless tobacco: Former User    Tobacco comment: uses marijuana daily   Substance and Sexual Activity    Alcohol use: No    Drug use: Not Currently     Types: Marijuana, Cocaine, Heroin     Comment: uses marijuana daily; last dose heroin 10/13/20    Sexual activity: Yes     Partners: Female       Home Medications:  Prior to Admission medications    Medication Sig Start Date End Date Taking?  Authorizing Provider   albuterol (ACCUNEB) 1.25 mg/3 mL nebu 6 mL by Nebulization route every four (4) hours as needed for Wheezing or Shortness of Breath (wheezing). 11/16/20   Florentino Acosta MD   omega 2-ZHK-WGT-fish oil (Fish Oil) 1,000 mg (120 mg-180 mg) capsule Take 1 Cap by mouth daily. Provider, Historical   ubidecarenone (COQ-10 PO) Take  by mouth. Provider, Historical   multivitamin (ONE A DAY) tablet Take 1 Tab by mouth daily. Provider, Historical   carvediloL (Coreg) 6.25 mg tablet Take  by mouth two (2) times daily (with meals). Provider, Historical   albuterol (PROVENTIL HFA, VENTOLIN HFA, PROAIR HFA) 90 mcg/actuation inhaler Take 2 Puffs by inhalation every four (4) hours as needed for Wheezing. 8/6/20   Florentino Acosta MD   mometasone-formoterol Jock Mustache) 100-5 mcg/actuation HFA inhaler Take 2 Puffs by inhalation two (2) times a day. 8/6/20   Florentino Acosta MD   aspirin 81 mg chewable tablet Take 1 Tab by mouth daily. 7/20/20   Vilma Romero MD   atorvastatin (LIPITOR) 40 mg tablet Take 1 Tab by mouth nightly. 7/20/20   Vilma Romero MD   lisinopriL (PRINIVIL, ZESTRIL) 10 mg tablet Take 1 Tab by mouth daily. Patient taking differently: Take 20 mg by mouth daily. 7/20/20   Vilma Romero MD   spironolactone (ALDACTONE) 25 mg tablet Take 1 Tab by mouth daily. 7/20/20   Vilma Romero MD   ticagrelor (BRILINTA) 90 mg tablet Take 1 Tab by mouth two (2) times a day. 7/20/20   Vilma Romero MD   Nebulizer Accessories misc by Misc. (Non-Drug; Combo Route) route. Provider, Historical   albuterol (PROVENTIL VENTOLIN) 2.5 mg /3 mL (0.083 %) nebu Take 2.5 mg by inhalation. 11/17/19   Provider, Historical   tiotropium (SPIRIVA) 18 mcg inhalation capsule Take 1 Cap by inhalation daily. 6/10/20   Florentino Acosta MD       Allergies:   Allergies   Allergen Reactions    Penicillins Rash           Physical Exam:     Visit Vitals  BP (!) 129/90   Pulse 90   Temp 98.3 °F (36.8 °C)   Resp 23   SpO2 100%       Physical Exam:  General appearance: alert, cooperative, no distress, appears stated age  Head: Normocephalic, without obvious abnormality, atraumatic  Neck: supple, trachea midline  Lungs: mild b/l wheezing  Heart: regular rate and rhythm, S1, S2 normal, no murmur, click, rub or gallop  Abdomen: soft, non-tender. Bowel sounds normal. No masses,  no organomegaly  Extremities: extremities normal, atraumatic, no cyanosis or edema  Skin: Skin color, texture, turgor normal. No rashes or lesions  Neurologic: Grossly normal    Intake and Output:  Current Shift:  No intake/output data recorded. Last three shifts:  No intake/output data recorded.     Lab/Data Reviewed:  CMP:   Lab Results   Component Value Date/Time     (L) 12/01/2020 06:44 PM    K 4.6 12/01/2020 06:44 PM    CL 99 (L) 12/01/2020 06:44 PM    CO2 29 12/01/2020 06:44 PM    AGAP 4 12/01/2020 06:44 PM     (H) 12/01/2020 06:44 PM    BUN 17 12/01/2020 06:44 PM    CREA 0.82 12/01/2020 06:44 PM    GFRAA >60 12/01/2020 06:44 PM    GFRNA >60 12/01/2020 06:44 PM    CA 9.4 12/01/2020 06:44 PM    MG 3.0 (H) 12/01/2020 06:49 PM    ALB 3.8 12/01/2020 06:44 PM    TP 7.3 12/01/2020 06:44 PM    GLOB 3.5 12/01/2020 06:44 PM    AGRAT 1.1 12/01/2020 06:44 PM    ALT 44 12/01/2020 06:44 PM     CBC:   Lab Results   Component Value Date/Time    WBC 15.6 (H) 12/01/2020 05:44 PM    HGB 15.4 12/01/2020 05:44 PM    HCT 47.3 12/01/2020 05:44 PM     12/01/2020 05:44 PM     All Cardiac Markers in the last 24 hours:   Lab Results   Component Value Date/Time    CPK 61 12/01/2020 06:44 PM    CKMB 1.3 12/01/2020 06:44 PM    CKND1 2.1 12/01/2020 06:44 PM    TROIQ <0.02 12/01/2020 06:44 PM     Xray chest looks clear only with hyperinflation  Demarcus Hardy MD    December 1, 2020

## 2020-12-02 NOTE — ED NOTES
This patient has been recommended for inpatient treatment and is awaiting placement. The ED provider has reviewed the patients history and medications, diet, and treatments and will order as necessary. The patient will be observed and attended to as their medical needs require and/or policy dictates    Pt given new gown. Breakfast tray given to pt  Denies any other needs at this time.

## 2020-12-02 NOTE — PROGRESS NOTES
conducted an initial consultation and Spiritual Assessment for [facility], who is a 48 y.o.,male. Patients Primary Language is: [place]. According to the patients EMR Buddhist Affiliation is: No Judaism. The reason the Patient came to the hospital is:   Patient Active Problem List    Diagnosis Date Noted    COPD exacerbation ([facility] 12/01/2020    NSTEMI (non-ST elevated myocardial infarction) ([facility] 06/24/2020    Drinks beer 11/17/2019    Marijuana user 11/17/2019    Non-compliance 11/17/2019    Stage 3 severe COPD by GOLD classification ([facility] 11/16/2019    High blood pressure 11/01/2014    Acute on chronic respiratory failure with hypoxia ([facility] 11/01/2014       completed the initial Spiritual Assessment of the patient in bed 7 of the emergency room and offered Pastoral Care support to the patient . Patient does not have any Jain/cultural needs that will affect patients preferences in health care. Chaplains will continue to follow and will provide pastoral care on an as needed/requested basis.     Con[facility]  Spiritual Care Department  [phone]

## 2020-12-02 NOTE — ED NOTES
Pt helped to use urinal.  Neb started per order. Report given to [facility]. No longer primary RN.

## 2020-12-03 ENCOUNTER — PATIENT OUTREACH (OUTPATIENT)
Dept: CASE MANAGEMENT | Age: 50
End: 2020-12-03

## 2020-12-07 LAB
BACTERIA SPEC CULT: NORMAL
BACTERIA SPEC CULT: NORMAL
SERVICE CMNT-IMP: NORMAL
SERVICE CMNT-IMP: NORMAL

## 2020-12-23 NOTE — TELEPHONE ENCOUNTER
Pt states he needs a refill for Dulera, Spiriva and albuterol inhaler to be sent to [address] [place] [address]. Please advise [phone].

## 2021-01-13 ENCOUNTER — OFFICE VISIT (OUTPATIENT)
Dept: INTERNAL MEDICINE CLINIC | Age: 51
End: 2021-01-13
Payer: COMMERCIAL

## 2021-01-13 ENCOUNTER — HOSPITAL ENCOUNTER (OUTPATIENT)
Dept: LAB | Age: 51
Discharge: HOME | End: 2021-01-13
Payer: COMMERCIAL

## 2021-01-13 VITALS
HEART RATE: 69 BPM | TEMPERATURE: 97.2 F | RESPIRATION RATE: 20 BRPM | SYSTOLIC BLOOD PRESSURE: 130 MMHG | DIASTOLIC BLOOD PRESSURE: 81 MMHG | WEIGHT: 136 LBS | BODY MASS INDEX: 21.35 KG/M2 | OXYGEN SATURATION: 95 % | HEIGHT: 67 IN

## 2021-01-13 DIAGNOSIS — F19.11 HISTORY OF INTRAVENOUS DRUG ABUSE (HCC): ICD-10-CM

## 2021-01-13 DIAGNOSIS — R73.09 ELEVATED GLUCOSE: ICD-10-CM

## 2021-01-13 DIAGNOSIS — Z23 ENCOUNTER FOR IMMUNIZATION: ICD-10-CM

## 2021-01-13 DIAGNOSIS — Z12.11 COLON CANCER SCREENING: ICD-10-CM

## 2021-01-13 DIAGNOSIS — Z23 NEEDS FLU SHOT: ICD-10-CM

## 2021-01-13 DIAGNOSIS — Z12.5 PROSTATE CANCER SCREENING: ICD-10-CM

## 2021-01-13 DIAGNOSIS — J44.9 CHRONIC OBSTRUCTIVE PULMONARY DISEASE, UNSPECIFIED COPD TYPE (HCC): Primary | ICD-10-CM

## 2021-01-13 DIAGNOSIS — I25.10 CORONARY ARTERY DISEASE INVOLVING NATIVE CORONARY ARTERY OF NATIVE HEART WITHOUT ANGINA PECTORIS: ICD-10-CM

## 2021-01-13 LAB
HBA1C MFR BLD: 6.1 % (ref 4.2–5.6)
PSA SERPL-MCNC: 0.5 NG/ML (ref 0–4)

## 2021-01-13 PROCEDURE — 90471 IMMUNIZATION ADMIN: CPT | Performed by: PHYSICIAN ASSISTANT

## 2021-01-13 PROCEDURE — 99214 OFFICE O/P EST MOD 30 MIN: CPT | Performed by: PHYSICIAN ASSISTANT

## 2021-01-13 PROCEDURE — 90472 IMMUNIZATION ADMIN EACH ADD: CPT | Performed by: PHYSICIAN ASSISTANT

## 2021-01-13 PROCEDURE — 87389 HIV-1 AG W/HIV-1&-2 AB AG IA: CPT

## 2021-01-13 PROCEDURE — 83036 HEMOGLOBIN GLYCOSYLATED A1C: CPT

## 2021-01-13 PROCEDURE — 90686 IIV4 VACC NO PRSV 0.5 ML IM: CPT | Performed by: PHYSICIAN ASSISTANT

## 2021-01-13 PROCEDURE — 36415 COLL VENOUS BLD VENIPUNCTURE: CPT

## 2021-01-13 PROCEDURE — 90732 PPSV23 VACC 2 YRS+ SUBQ/IM: CPT | Performed by: PHYSICIAN ASSISTANT

## 2021-01-13 PROCEDURE — 84153 ASSAY OF PSA TOTAL: CPT

## 2021-01-13 RX ORDER — MOMETASONE FUROATE AND FORMOTEROL FUMARATE DIHYDRATE 100; 5 UG/1; UG/1
2 AEROSOL RESPIRATORY (INHALATION) 2 TIMES DAILY
Qty: 1 INHALER | Refills: 0 | Status: SHIPPED | OUTPATIENT
Start: 2021-01-13 | End: 2021-01-17

## 2021-01-13 RX ORDER — IPRATROPIUM BROMIDE 0.5 MG/2.5ML
0.5 SOLUTION RESPIRATORY (INHALATION)
COMMUNITY
Start: 2020-11-17 | End: 2021-02-08 | Stop reason: SDUPTHER

## 2021-01-13 RX ORDER — ASPIRIN 81 MG/1
TABLET ORAL DAILY
COMMUNITY

## 2021-01-13 NOTE — PROGRESS NOTES
Jeanie Kim is a 48 y.o. male (: 1970) presenting to address:    Chief Complaint   Patient presents with   [address] Patient    Establish Care       Vitals:    21 1001   BP: 130/81   Pulse: 69   Resp: 20   Temp: 97.2 °F (36.2 °C)   TempSrc: Oral   SpO2: 95%   Weight: 136 lb (61.7 kg)   Height: 5' 7\" (1.702 m)   PainSc:   0 - No pain       Hearing/Vision:   No exam data present    Learning Assessment:     Learning Assessment 2020   PRIMARY LEARNER Patient   HIGHEST LEVEL OF EDUCATION - PRIMARY LEARNER  2 YEARS OF COLLEGE   BARRIERS PRIMARY LEARNER NONE   CO-LEARNER CAREGIVER No   PRIMARY LANGUAGE ENGLISH   LEARNER PREFERENCE PRIMARY READING   ANSWERED BY Rina Montiel   RELATIONSHIP SELF     Depression Screening:     3 most recent PHQ Screens 2021   Little interest or pleasure in doing things Several days   Feeling down, depressed, irritable, or hopeless Not at all   Total Score PHQ 2 1   Trouble falling or staying asleep, or sleeping too much -   Feeling tired or having little energy -   Poor appetite, weight loss, or overeating -   Feeling bad about yourself - or that you are a failure or have let yourself or your family down -   Trouble concentrating on things such as school, work, reading, or watching TV -   Moving or speaking so slowly that other people could have noticed; or the opposite being so fidgety that others notice -   Thoughts of being better off dead, or hurting yourself in some way -   PHQ 9 Score -   How difficult have these problems made it for you to do your work, take care of your home and get along with others -     Fall Risk Assessment:     Fall Risk Assessment, last 12 mths 2020   Able to walk? Yes   Fall in past 12 months? No     Abuse Screening:   No flowsheet data found. Coordination of Care Questionaire:   1. Have you been to the ER, urgent care clinic since your last visit? Hospitalized since your last visit? Yes hope    2.  Have you seen or consulted any other health care providers outside of the [address] [place] [address] since your last visit? Include any pap smears or colon screening. NO    Advanced Directive:   1. Do you have an Advanced Directive? NO    2. Would you like information on Advanced Directives?  NO

## 2021-01-13 NOTE — PROGRESS NOTES
Flu and Pneumococcal Immunization/s administered 1/13/2021 by Paco Weston with consent. Patient tolerated procedure well. No reactions noted.

## 2021-01-13 NOTE — PROGRESS NOTES
HISTORY OF PRESENT ILLNESS  Sigrid Spring is a 48 y.o. male. HPI  Presents as a new patient to establish care. Reports prior PCP left and he has been without a PCP for a bit. 1) CAD with coronary stent, cardiomyopathy - last seen by Dr. Steve Dennison of cardiology but long-term management to be by [facility] cardiology.   - Non-STEMI 6/2020.   - CAD: On Duc Liz (at least for a year), Coreg, Lipitor.  - Cardiomyopathy: On Coreg, Lisinopril, Spironolactone. - Upcoming appt with NIVIA Bonilla of cardiology 2/2/21. 2) Hx of substance abuse - reports not doing drugs or drinking at present. Attending [place] [id number] meetings. - Neg Hep C Ab 6/2020. Neg Hep B screen 6/2020.     3) COPD - out of Spiriva and Dulera x 1 week. Currently only doing nebs at home and his rescue inhaler (Atrovent, Albuterol). Currently taking Prednisone po as well. - Follows with Dr. Kathie Ruvalcaba. Upcoming appt 2/8/21.   - Last ER visit 1/10/21. Discharged with Prednisone x 4 days and Atrovent nebs. 4) HTN - controlled. Admits to some orthostasis. He is trying to change positions slower. He admits to needing to consume more fluids. 5) HLD - Lipitor 40 mg.   Lab Results   Component Value Date/Time    Cholesterol, total 123 10/05/2020 11:56 AM    HDL Cholesterol 66 (H) 10/05/2020 11:56 AM    LDL, calculated 41.8 10/05/2020 11:56 AM    VLDL, calculated 15.2 10/05/2020 11:56 AM    Triglyceride 76 10/05/2020 11:56 AM    CHOL/HDL Ratio 1.9 10/05/2020 11:56 AM     Lab Results   Component Value Date/Time    Sodium 136 12/02/2020 07:36 AM    Potassium 4.4 12/02/2020 07:36 AM    Chloride 104 12/02/2020 07:36 AM    CO2 24 12/02/2020 07:36 AM    Anion gap 8 12/02/2020 07:36 AM    Glucose 145 (H) 12/02/2020 07:36 AM    BUN 17 12/02/2020 07:36 AM    Creatinine 0.77 12/02/2020 07:36 AM    BUN/Creatinine ratio 22 (H) 12/02/2020 07:36 AM    GFR est AA >60 12/02/2020 07:36 AM    GFR est non-AA >60 12/02/2020 07:36 AM    Calcium 8.9 12/02/2020 07:36 AM Bilirubin, total 1.3 (H) 12/01/2020 06:44 PM    Alk. phosphatase 64 12/01/2020 06:44 PM    Protein, total 7.3 12/01/2020 06:44 PM    Albumin 3.8 12/01/2020 06:44 PM    Globulin 3.5 12/01/2020 06:44 PM    A-G Ratio 1.1 12/01/2020 06:44 PM    ALT (SGPT) 44 12/01/2020 06:44 PM    AST (SGOT) 18 12/01/2020 06:44 PM           Review of Systems   Cardiovascular: Negative for chest pain. Neurological: Positive for dizziness (with quick standing). Visit Vitals  /81 (BP 1 Location: Right arm, BP Patient Position: Sitting)   Pulse 69   Temp 97.2 °F (36.2 °C) (Oral)   Resp 20   Ht 5' 7\" (1.702 m)   Wt 136 lb (61.7 kg)   SpO2 95%   BMI 21.30 kg/m²       Physical Exam  Constitutional:       General: He is not in acute distress. Appearance: Normal appearance. He is well-developed. HENT:      Head: Normocephalic and atraumatic. Right Ear: Tympanic membrane, ear canal and external ear normal.      Left Ear: Tympanic membrane, ear canal and external ear normal.      Nose: Nose normal.      Mouth/Throat:      Comments: Mask  Eyes:      General: No scleral icterus. Conjunctiva/sclera: Conjunctivae normal.      Pupils: Pupils are equal, round, and reactive to light. Neck:      Musculoskeletal: Neck supple. Cardiovascular:      Rate and Rhythm: Normal rate and regular rhythm. Pulses: Normal pulses. Dorsalis pedis pulses are 2+ on the right side and 2+ on the left side. Posterior tibial pulses are 2+ on the right side and 2+ on the left side. Heart sounds: Normal heart sounds. No murmur. No gallop. Pulmonary:      Effort: Pulmonary effort is normal. No respiratory distress. Breath sounds: Normal breath sounds. No decreased breath sounds, wheezing, rhonchi or rales. Musculoskeletal:      Right lower leg: No edema. Left lower leg: No edema. Lymphadenopathy:      Head:      Right side of head: No submandibular or tonsillar adenopathy.       Left side of head: No submandibular or tonsillar adenopathy. Cervical: No cervical adenopathy. Upper Body:      Right upper body: No supraclavicular adenopathy. Left upper body: No supraclavicular adenopathy. Skin:     General: Skin is warm and dry. Neurological:      Mental Status: He is alert and oriented to person, place, and time. Psychiatric:         Speech: Speech normal.         ASSESSMENT and PLAN  Diagnoses and all orders for this visit:    1. Chronic obstructive pulmonary disease, unspecified COPD type ([facility]  - 1 month inhaler refills given so patient does not run out prior to pulm consultation. Sent to new pharmacy. Advised to update pharmacy with pulm specialist.    2. Coronary artery disease involving native coronary artery of native heart without angina pectoris  - Upcoming cardiology consultation. 3. Elevated glucose  -     HEMOGLOBIN A1C W/O EAG; Future    4. History of intravenous drug abuse ([facility]  -     HIV 1/2 AG/AB, 4TH GENERATION,W RFLX CONFIRM; Future  - He is motivated to maintain sobriety and to avoid drug use. Encouragement given. He is on the right path. I am very pleased that he is working so hard to improve his health and comply with specialty follow-ups/recommendations. 5. Colon cancer screening  -     REFERRAL TO GASTROENTEROLOGY    6. Prostate cancer screening  -     PSA SCREENING (SCREENING); Future    7. Needs flu shot  -     INFLUENZA VIRUS VAC QUAD,SPLIT,PRESV FREE SYRINGE IM    8. Encounter for immunization  -     PNEUMOCOCCAL POLYSACCHARIDE VACCINE, 23-VALENT, ADULT OR IMMUNOSUPPRESSED PT DOSE,  -     MT IMMUNIZ ADMIN,1 SINGLE/COMB VAC/TOXOID      Follow-up and Dispositions    · Return in about 3 months (around 4/13/2021) for follow-up chronic conditions. Plan for Tdap next visit.

## 2021-01-15 LAB
HIV 1+2 AB+HIV1 P24 AG SERPL QL IA: NONREACTIVE
HIV12 RESULT COMMENT, HHIVC: NORMAL

## 2021-01-20 ENCOUNTER — APPOINTMENT (OUTPATIENT)
Dept: GENERAL RADIOLOGY | Age: 51
End: 2021-01-20
Attending: EMERGENCY MEDICINE
Payer: COMMERCIAL

## 2021-01-20 ENCOUNTER — HOSPITAL ENCOUNTER (EMERGENCY)
Age: 51
Discharge: HOME | End: 2021-01-20
Attending: EMERGENCY MEDICINE
Payer: COMMERCIAL

## 2021-01-20 VITALS
OXYGEN SATURATION: 99 % | DIASTOLIC BLOOD PRESSURE: 69 MMHG | RESPIRATION RATE: 18 BRPM | SYSTOLIC BLOOD PRESSURE: 133 MMHG | HEART RATE: 79 BPM | TEMPERATURE: 98.6 F

## 2021-01-20 DIAGNOSIS — L03.115 CELLULITIS OF RIGHT LOWER EXTREMITY: Primary | ICD-10-CM

## 2021-01-20 LAB
ALBUMIN SERPL-MCNC: 3.8 G/DL (ref 3.4–5)
ALBUMIN/GLOB SERPL: 1.2 {RATIO} (ref 0.8–1.7)
ALP SERPL-CCNC: 62 U/L (ref 45–117)
ALT SERPL-CCNC: 33 U/L (ref 16–61)
AMPHET UR QL SCN: NEGATIVE
ANION GAP SERPL CALC-SCNC: 7 MMOL/L (ref 3–18)
APPEARANCE UR: CLEAR
AST SERPL-CCNC: 7 U/L (ref 10–38)
BARBITURATES UR QL SCN: NEGATIVE
BASOPHILS # BLD: 0 K/UL (ref 0–0.1)
BASOPHILS NFR BLD: 0 % (ref 0–2)
BENZODIAZ UR QL: NEGATIVE
BILIRUB SERPL-MCNC: 0.7 MG/DL (ref 0.2–1)
BILIRUB UR QL: NEGATIVE
BUN SERPL-MCNC: 26 MG/DL (ref 7–18)
BUN/CREAT SERPL: 21 (ref 12–20)
CALCIUM SERPL-MCNC: 9.7 MG/DL (ref 8.5–10.1)
CANNABINOIDS UR QL SCN: POSITIVE
CHLORIDE SERPL-SCNC: 97 MMOL/L (ref 100–111)
CO2 SERPL-SCNC: 27 MMOL/L (ref 21–32)
COCAINE UR QL SCN: NEGATIVE
COLOR UR: YELLOW
CREAT SERPL-MCNC: 1.22 MG/DL (ref 0.6–1.3)
DIFFERENTIAL METHOD BLD: ABNORMAL
EOSINOPHIL # BLD: 0.2 K/UL (ref 0–0.4)
EOSINOPHIL NFR BLD: 2 % (ref 0–5)
ERYTHROCYTE [DISTWIDTH] IN BLOOD BY AUTOMATED COUNT: 12.6 % (ref 11.6–14.5)
ERYTHROCYTE [SEDIMENTATION RATE] IN BLOOD: 36 MM/HR (ref 0–15)
GLOBULIN SER CALC-MCNC: 3.3 G/DL (ref 2–4)
GLUCOSE SERPL-MCNC: 102 MG/DL (ref 74–99)
GLUCOSE UR STRIP.AUTO-MCNC: NEGATIVE MG/DL
HCT VFR BLD AUTO: 43.7 % (ref 36–48)
HDSCOM,HDSCOM: ABNORMAL
HGB BLD-MCNC: 14.3 G/DL (ref 13–16)
HGB UR QL STRIP: NEGATIVE
KETONES UR QL STRIP.AUTO: NEGATIVE MG/DL
LEUKOCYTE ESTERASE UR QL STRIP.AUTO: NEGATIVE
LYMPHOCYTES # BLD: 2.6 K/UL (ref 0.9–3.6)
LYMPHOCYTES NFR BLD: 20 % (ref 21–52)
MCH RBC QN AUTO: 31.4 PG (ref 24–34)
MCHC RBC AUTO-ENTMCNC: 32.7 G/DL (ref 31–37)
MCV RBC AUTO: 95.8 FL (ref 74–97)
METHADONE UR QL: NEGATIVE
MONOCYTES # BLD: 1 K/UL (ref 0.05–1.2)
MONOCYTES NFR BLD: 8 % (ref 3–10)
NEUTS SEG # BLD: 9.3 K/UL (ref 1.8–8)
NEUTS SEG NFR BLD: 70 % (ref 40–73)
NITRITE UR QL STRIP.AUTO: NEGATIVE
OPIATES UR QL: NEGATIVE
PCP UR QL: NEGATIVE
PH UR STRIP: 5.5 [PH] (ref 5–8)
PLATELET # BLD AUTO: 309 K/UL (ref 135–420)
PMV BLD AUTO: 9.5 FL (ref 9.2–11.8)
POTASSIUM SERPL-SCNC: 4.4 MMOL/L (ref 3.5–5.5)
PROT SERPL-MCNC: 7.1 G/DL (ref 6.4–8.2)
PROT UR STRIP-MCNC: NEGATIVE MG/DL
RBC # BLD AUTO: 4.56 M/UL (ref 4.7–5.5)
SODIUM SERPL-SCNC: 131 MMOL/L (ref 136–145)
SP GR UR REFRACTOMETRY: 1.01 (ref 1–1.03)
UROBILINOGEN UR QL STRIP.AUTO: 0.2 EU/DL (ref 0.2–1)
WBC # BLD AUTO: 13.1 K/UL (ref 4.6–13.2)

## 2021-01-20 PROCEDURE — 81003 URINALYSIS AUTO W/O SCOPE: CPT

## 2021-01-20 PROCEDURE — 74011250636 HC RX REV CODE- 250/636: Performed by: EMERGENCY MEDICINE

## 2021-01-20 PROCEDURE — 73564 X-RAY EXAM KNEE 4 OR MORE: CPT

## 2021-01-20 PROCEDURE — 85652 RBC SED RATE AUTOMATED: CPT

## 2021-01-20 PROCEDURE — 73590 X-RAY EXAM OF LOWER LEG: CPT

## 2021-01-20 PROCEDURE — 99283 EMERGENCY DEPT VISIT LOW MDM: CPT

## 2021-01-20 PROCEDURE — 80307 DRUG TEST PRSMV CHEM ANLYZR: CPT

## 2021-01-20 PROCEDURE — 96374 THER/PROPH/DIAG INJ IV PUSH: CPT

## 2021-01-20 PROCEDURE — 86141 C-REACTIVE PROTEIN HS: CPT

## 2021-01-20 PROCEDURE — 80053 COMPREHEN METABOLIC PANEL: CPT

## 2021-01-20 PROCEDURE — 85025 COMPLETE CBC W/AUTO DIFF WBC: CPT

## 2021-01-20 PROCEDURE — 74011250637 HC RX REV CODE- 250/637: Performed by: EMERGENCY MEDICINE

## 2021-01-20 RX ORDER — SULFAMETHOXAZOLE AND TRIMETHOPRIM 800; 160 MG/1; MG/1
1 TABLET ORAL 2 TIMES DAILY
Qty: 14 TAB | Refills: 0 | Status: SHIPPED | OUTPATIENT
Start: 2021-01-20 | End: 2021-01-27

## 2021-01-20 RX ORDER — ONDANSETRON 2 MG/ML
4 INJECTION INTRAMUSCULAR; INTRAVENOUS
Status: COMPLETED | OUTPATIENT
Start: 2021-01-20 | End: 2021-01-20

## 2021-01-20 RX ORDER — CEPHALEXIN 500 MG/1
500 CAPSULE ORAL 4 TIMES DAILY
Qty: 28 CAP | Refills: 0 | Status: SHIPPED | OUTPATIENT
Start: 2021-01-20 | End: 2021-01-27

## 2021-01-20 RX ORDER — HYDROCODONE BITARTRATE AND ACETAMINOPHEN 5; 325 MG/1; MG/1
1 TABLET ORAL ONCE
Status: COMPLETED | OUTPATIENT
Start: 2021-01-20 | End: 2021-01-20

## 2021-01-20 RX ORDER — HYDROCODONE BITARTRATE AND ACETAMINOPHEN 5; 325 MG/1; MG/1
1 TABLET ORAL
Qty: 6 TAB | Refills: 0 | Status: SHIPPED | OUTPATIENT
Start: 2021-01-20 | End: 2021-01-23

## 2021-01-20 RX ADMIN — HYDROCODONE BITARTRATE AND ACETAMINOPHEN 1 TABLET: 5; 325 TABLET ORAL at 19:19

## 2021-01-20 RX ADMIN — SODIUM CHLORIDE 1000 ML: 9 INJECTION, SOLUTION INTRAVENOUS at 19:21

## 2021-01-20 RX ADMIN — ONDANSETRON 4 MG: 2 INJECTION INTRAMUSCULAR; INTRAVENOUS at 19:21

## 2021-01-20 NOTE — ED NOTES
I performed a brief history of the patient here in triage and I have determined that pt will need further treatment and evaluation from the main side ER physician or [facility]. I have placed initial orders based on the history to help in expediting patients care.        Visit Vitals  BP 95/63   Pulse 94   Temp 98.6 °F (37 °C)   Resp 18   SpO2 100%      NIVIA Velázquez 4:59 PM

## 2021-01-20 NOTE — ED PROVIDER NOTES
[facility]  EMERGENCY DEPARTMENT HISTORY AND PHYSICAL EXAM       Date: 2021   Patient Name: Dio Gant   YOB: 1970  Medical Record Number: [id number]    HISTORY OF PRESENTING ILLNESS:     Dio Gant is a 48 y.o. male presenting with the noted PMH to the ED c/o right leg pain. Patient states it started about 3 days ago with a bug bite and pimple over his tibial tuberosity area. He states is gotten bigger now and swelling to his anterior shin area and up to below his right knee. He states the pain is a constant aching throbbing pain. Increases when you touch it. No decreasing factors. He denies any fevers. Denies any cough or cold symptoms. Denies any chest pain or shortness of breath. Denies any abdominal pain. He states he has been feeling nauseated. Denies any injury or trauma. He states he does have a history of CHF. He states blood pressure can run low sometimes.     Rest of complete systems reviewed and negative    Primary Care Provider: NIVIA Mercer   Specialist:    Past Medical History:   Past Medical History:   Diagnosis Date    Asthma     CAD (coronary artery disease)     Cardiomyopathy ([facility]     LVEf 20% () 50%(10/20)     Chronic obstructive pulmonary disease (HCC)     Myocardial infarction ([facility] 2020    Stage 3 severe COPD by GOLD classification ([facility] 2019    Substance abuse ([facility]     Marijuana, heroin, ETOH, cocaine        Past Surgical History:   Past Surgical History:   Procedure Laterality Date    HX CAROTID STENT          Social History:   Social History     Tobacco Use    Smoking status: Former Smoker     Packs/day: 1.50     Years: 16.00     Pack years: 24.00     Types: Cigarettes     Start date: 1980     Quit date: 1994     Years since quittin.0    Smokeless tobacco: Former User    Tobacco comment: uses marijuana daily   Substance Use Topics    Alcohol use: No    Drug use: Not Currently Types: Marijuana, Cocaine, Heroin     Comment: uses marijuana daily; last dose heroin 10/13/20        Allergies: Allergies   Allergen Reactions    Penicillins Rash        REVIEW OF SYSTEMS:  Review of Systems      PHYSICAL EXAM:  Vitals:    01/20/21 1655 01/20/21 1900   BP: 95/63 133/69   Pulse: 94 79   Resp: 18 18   Temp: 98.6 °F (37 °C)    SpO2: 100% 99%       Physical Exam  Vital signs reviewed. Alert oriented x 3 in NAD. HEENT: normocephalic atraumatic. Eyes are PERRLA EOMI. Conjunctiva normal.    External ears and nose normal.    Neck: normal external exam. No midline neck or back TTP. Lungs are clear to ascultation bilaterally. normal effort  Heart is regular rate and rhythm with no murmurs. Abdomen soft and nontender. No rebound rigidity or guarding. Extremities: Moves all 4 extremities and no distress. Full range of motion. 2+ pulses and BCR in all 4 extremities. He does have a 3 mm circular lesion just above his tibial tuberosity on his right leg with soft tissue swelling and pitting edema. Does not involve his knee or posteriorly. Neuro: 5 out of 5 strength in all 4 extremities. No facial droop. Skin examination: intact. no rashes. No petechia or purpura. Medications   sodium chloride 0.9 % bolus infusion 1,000 mL (1,000 mL IntraVENous New Bag 1/20/21 1921)   HYDROcodone-acetaminophen (NORCO) 5-325 mg per tablet 1 Tab (1 Tab Oral Given 1/20/21 1919)   ondansetron (ZOFRAN) injection 4 mg (4 mg IntraVENous Given 1/20/21 1921)       RESULTS:    Labs -   Labs Reviewed   CBC WITH AUTOMATED DIFF - Abnormal; Notable for the following components:       Result Value    RBC 4.56 (*)     LYMPHOCYTES 20 (*)     ABS.  NEUTROPHILS 9.3 (*)     All other components within normal limits   METABOLIC PANEL, COMPREHENSIVE - Abnormal; Notable for the following components:    Sodium 131 (*)     Chloride 97 (*)     Glucose 102 (*)     BUN 26 (*)     BUN/Creatinine ratio 21 (*)     AST (SGOT) 7 (*) All other components within normal limits   SED RATE (ESR) - Abnormal; Notable for the following components:    Sed rate, automated 36 (*)     All other components within normal limits   DRUG SCREEN, URINE - Abnormal; Notable for the following components:    THC (TH-CANNABINOL) Positive (*)     All other components within normal limits   CRP, HIGH SENSITIVITY   URINALYSIS W/ RFLX MICROSCOPIC   LACTIC ACID       Radiologic Studies -  No results found. MEDICAL DECISION MAKING    2000. Patient reassessed. Results and precautions explained. Patient states understanding and agrees with plan. Cellulitis boundaries demarcated. No evidence of gas or necrotizing fasciitis. No evidence of sepsis. No evidence of septic joint. No evidence of compartment syndrome. No evidence of fracture or dislocation. No evidence for DVT or PE. Will start patient on 2 antibiotics. Patient to return here if symptoms change or worsen. Otherwise he can have a wound recheck in 2 days here. Precautions explained. Patient states understanding and agrees with plan. Patient has no new complaints, changes, or physical findings. Results were reviewed with the patient. Pt's questions and concerns were addressed. Care plan was outlined, including follow-up with PCP/specialist and return precautions were discussed. Patient is felt to be stable for discharge at this time. Diagnosis   Clinical Impression:   1. Cellulitis of right lower extremity           Follow-up Information     Follow up With Specialties Details Why [address] [address] EMERGENCY DEPT Emergency Medicine Go in 2 days For wound re-check Leoncio E Harry Vilchis  [phone]          Current Discharge Medication List      START taking these medications    Details   trimethoprim-sulfamethoxazole (Bactrim DS) 160-800 mg per tablet Take 1 Tab by mouth two (2) times a day for 7 days.   Qty: 14 Tab, Refills: 0      cephALEXin (Keflex) 500 mg capsule Take 1 Cap by mouth four (4) times daily for 7 days. Qty: 28 Cap, Refills: 0      HYDROcodone-acetaminophen (Norco) 5-325 mg per tablet Take 1 Tab by mouth every four (4) hours as needed for Pain for up to 3 days. Max Daily Amount: 6 Tabs. Qty: 6 Tab, Refills: 0    Associated Diagnoses: Cellulitis of right lower extremity             Discharged in stable and improved condition. This chart was completed using Dragon, a dictation transcription service. Errors may have resulted from using this device.

## 2021-01-21 LAB — CRP SERPL HS-MCNC: >9.5 MG/L

## 2021-01-21 NOTE — DISCHARGE INSTRUCTIONS
Thank you so much for visiting us today and allowing me to be your care provider. I hope you feel you received very good care today. Please make sure to call your doctor today to be rechecked in 1-3 days. Bring your results from here with you when you do. Return immediately if any fevers, headaches, chest pain, difficulty breathing, abdominal pain, worsening or changing symptoms, or any other concerns.

## 2021-02-08 ENCOUNTER — OFFICE VISIT (OUTPATIENT)
Dept: PULMONOLOGY | Age: 51
End: 2021-02-08
Payer: COMMERCIAL

## 2021-02-08 VITALS
DIASTOLIC BLOOD PRESSURE: 82 MMHG | TEMPERATURE: 97.9 F | SYSTOLIC BLOOD PRESSURE: 132 MMHG | HEART RATE: 52 BPM | OXYGEN SATURATION: 99 % | HEIGHT: 67 IN | BODY MASS INDEX: 21.47 KG/M2 | RESPIRATION RATE: 18 BRPM | WEIGHT: 136.8 LBS

## 2021-02-08 DIAGNOSIS — F12.90 MARIJUANA USER: ICD-10-CM

## 2021-02-08 DIAGNOSIS — J96.21 ACUTE ON CHRONIC RESPIRATORY FAILURE WITH HYPOXIA (HCC): ICD-10-CM

## 2021-02-08 DIAGNOSIS — J44.9 STAGE 3 SEVERE COPD BY GOLD CLASSIFICATION (HCC): Primary | ICD-10-CM

## 2021-02-08 PROCEDURE — 99214 OFFICE O/P EST MOD 30 MIN: CPT | Performed by: INTERNAL MEDICINE

## 2021-02-08 RX ORDER — ALBUTEROL SULFATE 90 UG/1
2 AEROSOL, METERED RESPIRATORY (INHALATION)
Qty: 2 INHALER | Refills: 3 | Status: SHIPPED | OUTPATIENT
Start: 2021-02-08 | End: 2021-06-04 | Stop reason: SDUPTHER

## 2021-02-08 RX ORDER — IPRATROPIUM BROMIDE 0.5 MG/2.5ML
0.5 SOLUTION RESPIRATORY (INHALATION)
Qty: 180 VIAL | Refills: 3 | Status: SHIPPED | OUTPATIENT
Start: 2021-02-08

## 2021-02-08 RX ORDER — MOMETASONE FUROATE AND FORMOTEROL FUMARATE DIHYDRATE 100; 5 UG/1; UG/1
2 AEROSOL RESPIRATORY (INHALATION) 2 TIMES DAILY
Qty: 1 INHALER | Refills: 3 | Status: SHIPPED | OUTPATIENT
Start: 2021-02-08 | End: 2021-06-04 | Stop reason: SDUPTHER

## 2021-02-08 NOTE — LETTER
2/8/2021 Patient: Vito Durant  
YOB: 1970 Date of Visit: 2/8/2021 NIVIA Mccray 
[address] [facility] [address] [address] [address] [employer] Dear NIVIA Mccray, Thank you for referring Mr. Vito Durant to [address] [place] [address] for evaluation. My notes for this consultation are attached. If you have questions, please do not hesitate to call me. I look forward to following your patient along with you.  
 
 
Sincerely, 
 
Baylee Minaya MD

## 2021-02-08 NOTE — PROGRESS NOTES
Benson Bazzi presents today for   Chief Complaint   Patient presents with    Results     Alpha 1    Shortness of Breath       Is someone accompanying this pt? No    Is the patient using any DME equipment during OV? No    -DME Company NA    Depression Screening:  3 most recent PHQ Screens 1/13/2021   Little interest or pleasure in doing things Several days   Feeling down, depressed, irritable, or hopeless Not at all   Total Score PHQ 2 1   Trouble falling or staying asleep, or sleeping too much -   Feeling tired or having little energy -   Poor appetite, weight loss, or overeating -   Feeling bad about yourself - or that you are a failure or have let yourself or your family down -   Trouble concentrating on things such as school, work, reading, or watching TV -   Moving or speaking so slowly that other people could have noticed; or the opposite being so fidgety that others notice -   Thoughts of being better off dead, or hurting yourself in some way -   PHQ 9 Score -   How difficult have these problems made it for you to do your work, take care of your home and get along with others -       Learning Assessment:  Learning Assessment 7/16/2020   PRIMARY LEARNER Patient   HIGHEST LEVEL OF EDUCATION - PRIMARY LEARNER  2 YEARS OF COLLEGE   BARRIERS PRIMARY LEARNER NONE   CO-LEARNER CAREGIVER No   PRIMARY LANGUAGE ENGLISH   LEARNER PREFERENCE PRIMARY READING   ANSWERED BY Rakel WHITLOCK SELF       Abuse Screening:  No flowsheet data found. Fall Risk  Fall Risk Assessment, last 12 mths 7/16/2020   Able to walk? Yes   Fall in past 12 months? No         Coordination of Care:  1. Have you been to the ER, urgent care clinic since your last visit? Hospitalized since your last visit? Yes; Name: Ghislaine Pettit  ED 1/2021 SOB    2. Have you seen or consulted any other health care providers outside of the [address] [place] [address] since your last visit? Include any pap smears or colon screening.  No
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
## 2021-06-04 RX ORDER — ALBUTEROL SULFATE 90 UG/1
2 AEROSOL, METERED RESPIRATORY (INHALATION)
Qty: 2 INHALER | Refills: 3 | Status: SHIPPED | OUTPATIENT
Start: 2021-06-04 | End: 2022-01-11 | Stop reason: SDUPTHER

## 2021-06-04 RX ORDER — ALBUTEROL SULFATE 0.83 MG/ML
2.5 SOLUTION RESPIRATORY (INHALATION)
Qty: 30 NEBULE | Refills: 3 | Status: SHIPPED | OUTPATIENT
Start: 2021-06-04 | End: 2021-12-30 | Stop reason: SDUPTHER

## 2021-06-04 RX ORDER — MOMETASONE FUROATE AND FORMOTEROL FUMARATE DIHYDRATE 100; 5 UG/1; UG/1
2 AEROSOL RESPIRATORY (INHALATION) 2 TIMES DAILY
Qty: 1 INHALER | Refills: 5 | Status: SHIPPED | OUTPATIENT
Start: 2021-06-04

## 2021-06-04 NOTE — TELEPHONE ENCOUNTER
Pt needs refills of:  1. Albuterol Solution  2. Spiriva Handihaler  3. Dulera  4. Proventil inhaler  [facility] [address] [facility] [place]. Pleas call pt once they have been sent over.  [phone]

## 2021-10-21 LAB — COLONOSCOPY, EXTERNAL: NORMAL

## 2021-12-30 RX ORDER — ALBUTEROL SULFATE 0.83 MG/ML
2.5 SOLUTION RESPIRATORY (INHALATION)
Qty: 30 NEBULE | Refills: 1 | Status: SHIPPED | OUTPATIENT
Start: 2021-12-30 | End: 2022-01-12 | Stop reason: SDUPTHER

## 2021-12-30 NOTE — TELEPHONE ENCOUNTER
Pt needs refills of albuterol sol for nebulizer sent to [facility].  He is completely out.       Made aware we will call for apt once Feb schedule opens

## 2022-01-11 ENCOUNTER — TELEPHONE (OUTPATIENT)
Dept: PULMONOLOGY | Age: 52
End: 2022-01-11

## 2022-01-11 RX ORDER — ALBUTEROL SULFATE 90 UG/1
2 AEROSOL, METERED RESPIRATORY (INHALATION)
Qty: 1 EACH | Refills: 3 | Status: SHIPPED | OUTPATIENT
Start: 2022-01-11 | End: 2022-08-11 | Stop reason: SDUPTHER

## 2022-01-12 RX ORDER — ALBUTEROL SULFATE 0.83 MG/ML
2.5 SOLUTION RESPIRATORY (INHALATION)
Qty: 30 NEBULE | Refills: 1 | Status: SHIPPED | OUTPATIENT
Start: 2022-01-12 | End: 2022-08-11

## 2022-01-12 NOTE — TELEPHONE ENCOUNTER
Pt's girlfriend [id number]([phone]). Pt needs a refill for his Albuterol solution sent to [facility] [phone].

## 2022-01-14 ENCOUNTER — DOCUMENTATION ONLY (OUTPATIENT)
Dept: PULMONOLOGY | Age: 52
End: 2022-01-14

## 2022-03-18 PROBLEM — F12.90 MARIJUANA USER: Status: ACTIVE | Noted: 2019-11-17

## 2022-03-18 PROBLEM — Z78.9 DRINKS BEER: Status: ACTIVE | Noted: 2019-11-17

## 2022-03-18 PROBLEM — J44.1 COPD EXACERBATION (HCC): Status: ACTIVE | Noted: 2020-12-01

## 2022-03-19 PROBLEM — I21.4 NSTEMI (NON-ST ELEVATED MYOCARDIAL INFARCTION) (HCC): Status: ACTIVE | Noted: 2020-06-24

## 2022-03-19 PROBLEM — J44.9 STAGE 3 SEVERE COPD BY GOLD CLASSIFICATION (HCC): Status: ACTIVE | Noted: 2019-11-16

## 2022-03-19 PROBLEM — Z91.199 NON-COMPLIANCE: Status: ACTIVE | Noted: 2019-11-17

## 2022-04-05 ENCOUNTER — TELEPHONE (OUTPATIENT)
Dept: PULMONOLOGY | Age: 52
End: 2022-04-05

## 2022-04-05 NOTE — TELEPHONE ENCOUNTER
Patient last seen 1/2021. Refills are already in system from 1/2022.  Transferred to front to make appt

## 2022-04-21 ENCOUNTER — TELEPHONE (OUTPATIENT)
Dept: PULMONOLOGY | Age: 52
End: 2022-04-21

## 2022-04-21 ENCOUNTER — OFFICE VISIT (OUTPATIENT)
Dept: PULMONOLOGY | Age: 52
End: 2022-04-21
Payer: COMMERCIAL

## 2022-04-21 VITALS
DIASTOLIC BLOOD PRESSURE: 60 MMHG | TEMPERATURE: 98.3 F | BODY MASS INDEX: 20.88 KG/M2 | SYSTOLIC BLOOD PRESSURE: 98 MMHG | WEIGHT: 133 LBS | HEIGHT: 67 IN | OXYGEN SATURATION: 97 % | RESPIRATION RATE: 16 BRPM | HEART RATE: 64 BPM

## 2022-04-21 DIAGNOSIS — F12.90 MARIJUANA USER: ICD-10-CM

## 2022-04-21 DIAGNOSIS — J44.9 STAGE 3 SEVERE COPD BY GOLD CLASSIFICATION (HCC): Primary | ICD-10-CM

## 2022-04-21 PROCEDURE — 99214 OFFICE O/P EST MOD 30 MIN: CPT | Performed by: INTERNAL MEDICINE

## 2022-04-21 RX ORDER — BUDESONIDE, GLYCOPYRROLATE, AND FORMOTEROL FUMARATE 160; 9; 4.8 UG/1; UG/1; UG/1
2 AEROSOL, METERED RESPIRATORY (INHALATION) 2 TIMES DAILY
Qty: 1 EACH | Refills: 0 | Status: SHIPPED | COMMUNITY
Start: 2022-04-21 | End: 2022-04-21 | Stop reason: SDUPTHER

## 2022-04-21 RX ORDER — BUDESONIDE, GLYCOPYRROLATE, AND FORMOTEROL FUMARATE 160; 9; 4.8 UG/1; UG/1; UG/1
2 AEROSOL, METERED RESPIRATORY (INHALATION) 2 TIMES DAILY
Qty: 1 EACH | Refills: 4 | Status: SHIPPED | OUTPATIENT
Start: 2022-04-21 | End: 2022-04-22 | Stop reason: CLARIF

## 2022-04-21 NOTE — PROGRESS NOTES
Melany Dial presents today for   Chief Complaint   Patient presents with    Cough    Shortness of Breath     on exertion     Wheezing       Is someone accompanying this pt? No    Is the patient using any DME equipment during OV? No    -DME Company NA    Depression Screening:  3 most recent PHQ Screens 1/13/2021   Little interest or pleasure in doing things Several days   Feeling down, depressed, irritable, or hopeless Not at all   Total Score PHQ 2 1   Trouble falling or staying asleep, or sleeping too much -   Feeling tired or having little energy -   Poor appetite, weight loss, or overeating -   Feeling bad about yourself - or that you are a failure or have let yourself or your family down -   Trouble concentrating on things such as school, work, reading, or watching TV -   Moving or speaking so slowly that other people could have noticed; or the opposite being so fidgety that others notice -   Thoughts of being better off dead, or hurting yourself in some way -   PHQ 9 Score -   How difficult have these problems made it for you to do your work, take care of your home and get along with others -       Learning Assessment:  Learning Assessment 7/16/2020   PRIMARY LEARNER Patient   HIGHEST LEVEL OF EDUCATION - PRIMARY LEARNER  2 YEARS OF COLLEGE   BARRIERS PRIMARY LEARNER NONE   CO-LEARNER CAREGIVER No   PRIMARY LANGUAGE ENGLISH   LEARNER PREFERENCE PRIMARY READING   ANSWERED BY Diana Coyle   RELATIONSHIP SELF       Abuse Screening:  No flowsheet data found. Fall Risk  Fall Risk Assessment, last 12 mths 7/16/2020   Able to walk? Yes   Fall in past 12 months? No         Coordination of Care:  1. Have you been to the ER, urgent care clinic since your last visit? Hospitalized since your last visit? No    2. Have you seen or consulted any other health care providers outside of the [address] [place] [address] since your last visit? Include any pap smears or colon screening.  No

## 2022-04-21 NOTE — TELEPHONE ENCOUNTER
Henna Davis is not covered by Medicaid. Nor is Trelegy.   Anoro or Stiolto and Flovent's and Pulmicort flexhaler  Advair and Atrovent HFA  Are preferred medications
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
## 2022-04-21 NOTE — LETTER
4/21/2022    Patient: Anthony Gross   YOB: 1970   Date of Visit: 4/21/2022     Woodroe Epley, [place]  Via In Basket    Dear Woodroe Epley, [address],      Thank you for referring Mr. Anthony Gross to [address] [place] [address] for evaluation. My notes for this consultation are attached. If you have questions, please do not hesitate to call me. I look forward to following your patient along with you.       Sincerely,    Farzad Hobbs MD

## 2022-04-21 NOTE — PATIENT INSTRUCTIONS
COPD and Asthma: Care Instructions  Overview     Some people who have chronic obstructive pulmonary disease (COPD) may also have asthma. With both of these conditions, air doesn't flow easily in and out of your lungs. This can make it hard to breathe. You may be short of breath, wheeze, cough, or have mucus in your airways. When you have COPD and asthma, it's important to follow your treatment plan. There are two parts to your treatment:  · Controlling COPD and asthma over the long term. · Treating attacks or flare-ups when they occur. You and your doctor can make a plan that will help. This plan tells you the medicines you take to manage your symptoms and prevent attacks or flare-ups. It also tells you what to do if you have an attack or flare-up. Follow-up care is a key part of your treatment and safety. Be sure to make and go to all appointments, and call your doctor if you are having problems. It's also a good idea to know your test results and keep a list of the medicines you take. How can you care for yourself at home? To control COPD and asthma  · Do not smoke. Smoking can make COPD and asthma worse. If you need help quitting, talk to your doctor about stop-smoking programs and medicines. These can increase your chances of quitting for good. · Learn what sets off your COPD and asthma. Avoid these triggers when you can. Common triggers include smoke, pollen, pollution, and infections like colds, the flu, or pneumonia. · Check yourself for symptoms to know which step to follow in your action plan. Watch for things like being short of breath, having chest tightness, and coughing more than usual. Look for a change in the color or thickness of your mucus. Also notice if symptoms wake you up at night or if you get tired quickly when you exercise. · Check your lungs with a peak flow meter, if your doctor recommends it. Peak flow can tell you how well your lungs are working.   · Try pulmonary rehabilitation, if your doctor prescribes it. This combines different treatments to help you reduce your symptoms and stay as active and healthy as you can. Medicines   · Call your doctor if you think you are having a problem with your medicine. COPD and asthma are treated with medicine to help you breathe easier. You may take:  ? Controller medicines. These prevent attacks and flare-ups before they happen. They are taken regularly. There are different types. ? Quick-relief medicine. This is for times when you can't prevent symptoms and need to treat them fast. It can quickly relax the airways and allow you to breathe easier. · Learn how to use your inhalers the right way. Ask your doctor or pharmacist for help. · Use oxygen if your doctor recommends it. Oxygen therapy boosts the amount of oxygen in your blood and helps you breathe easier. Use the flow rate your doctor recommends. Do not change it without talking to your doctor first.  Preventing infections   · Wash your hands often. · Avoid contact with people who have colds and other respiratory infections. · Get a flu vaccine every year. Ask your doctor about getting the pneumococcal and whooping cough (pertussis) shots. To treat attacks when they occur  · Follow your action plan. It can tell you what to do when you have an attack or flare-up. · Take your quick-relief medicine exactly as prescribed. Talk with your doctor if you have any problems with your medicine. ? Keep this medicine with you at all times. ? You may need to use this medicine before you exercise to prevent an attack. · If your doctor prescribed corticosteroid pills or other medicines to use during an attack or flare-up, take them as directed. They may shorten the attack and help you breathe easier. When should you call for help? Call 911 anytime you think you may need emergency care.  For example, call if:    · You have severe trouble breathing.     · You are having chest pain that is different or worse than usual.   Call your doctor now or seek immediate medical care if:    · You have new or worse shortness of breath.     · You cough up blood.     · You have a fever.     · You have used your quick-relief medicine but you are still short of breath.     · You have feelings of anxiety or depression. Watch closely for changes in your health, and be sure to contact your doctor if:    · You are coughing more deeply or more often, or you notice more mucus or a change in the color of your mucus.     · You have new or worse swelling in your legs or belly.     · You are not getting better as expected. Where can you learn more? Go to http://www.gray.com/  Enter A350 in the search box to learn more about \"COPD and Asthma: Care Instructions. \"  Current as of: July 6, 2021               Content Version: 13.2  © 7996-3220 [employer]. Care instructions adapted under license by [employer] (which disclaims liability or warranty for this information). If you have questions about a medical condition or this instruction, always ask your healthcare professional. [address] any warranty or liability for your use of this information.

## 2022-04-22 RX ORDER — FLUTICASONE PROPIONATE 110 UG/1
2 AEROSOL, METERED RESPIRATORY (INHALATION) EVERY 12 HOURS
Qty: 12 G | Refills: 5 | Status: SHIPPED | OUTPATIENT
Start: 2022-04-22

## 2022-04-22 RX ORDER — UMECLIDINIUM BROMIDE AND VILANTEROL TRIFENATATE 62.5; 25 UG/1; UG/1
1 POWDER RESPIRATORY (INHALATION) DAILY
Qty: 60 EACH | Refills: 5 | Status: SHIPPED | OUTPATIENT
Start: 2022-04-22

## 2022-08-11 RX ORDER — ALBUTEROL SULFATE 0.83 MG/ML
2.5 SOLUTION RESPIRATORY (INHALATION)
Qty: 180 ML | Refills: 3 | Status: SHIPPED | OUTPATIENT
Start: 2022-08-11

## 2022-08-11 NOTE — TELEPHONE ENCOUNTER
Pt is having problems breathing pt needs albuterol so he wouldn't have to go to the hosp.  Pt wife is requesting a call back due going back and forth with [facility]

## 2022-08-11 NOTE — TELEPHONE ENCOUNTER
Patient requesting refill of albuiterol inhaler on 8/11/2022. Last office visit: 4/21/2022  Follow up Visit: Due October 2022    Provider is aware of last office visit and follow up. No further action requested from provider.

## 2022-08-12 RX ORDER — ALBUTEROL SULFATE 90 UG/1
2 AEROSOL, METERED RESPIRATORY (INHALATION)
Qty: 1 EACH | Refills: 3 | Status: SHIPPED | OUTPATIENT
Start: 2022-08-12

## 2022-08-17 ENCOUNTER — TELEPHONE (OUTPATIENT)
Dept: PULMONOLOGY | Age: 52
End: 2022-08-17

## 2022-11-15 ENCOUNTER — TELEPHONE (OUTPATIENT)
Dept: PULMONOLOGY | Age: 52
End: 2022-11-15

## 2022-11-15 NOTE — TELEPHONE ENCOUNTER
Spoke with patient. He has not been taking his Anoro for a long time. He was to take Anoro, flovent and Albuterol prn. [facility] is [facility] and filling [facility] and advised Anoro and flovent every day  Patient verbalizes understanding and wrote down the directions.

## 2022-11-15 NOTE — TELEPHONE ENCOUNTER
Pt's wife called office today requesting pt be seen. Offered first available but she stated that pt couldn't wait that long. She stated that he was out of his meds but they were sent to the pharmacy in august w/ 3 refills. She states he has been going to the emergency room often for his breathing. She is also requesting a new nebulizer be sent to [facility] in [place]. No phone number.  Please advise

## 2023-01-24 RX ORDER — ALBUTEROL SULFATE 0.83 MG/ML
2.5 SOLUTION RESPIRATORY (INHALATION)
Qty: 180 ML | Refills: 3 | Status: SHIPPED | OUTPATIENT
Start: 2023-01-24

## 2023-01-24 RX ORDER — ALBUTEROL SULFATE 90 UG/1
2 AEROSOL, METERED RESPIRATORY (INHALATION)
Qty: 1 EACH | Refills: 3 | Status: SHIPPED | OUTPATIENT
Start: 2023-01-24

## 2023-01-24 RX ORDER — IPRATROPIUM BROMIDE 0.5 MG/2.5ML
0.5 SOLUTION RESPIRATORY (INHALATION)
Qty: 180 EACH | Refills: 3 | Status: SHIPPED | OUTPATIENT
Start: 2023-01-24

## 2023-01-24 NOTE — TELEPHONE ENCOUNTER
Patient requesting refill for Rx    albuterol (PROVENTIL HFA, VENTOLIN HFA, PROAIR HFA) 90 mcg/actuation inhaler [[id number]]    albuterol (PROVENTIL VENTOLIN) 2.5 mg /3 mL (0.083 %) nebu [[id number]]    ipratropium (ATROVENT) 0.02 % soln [[id number]]

## 2023-02-24 ENCOUNTER — OFFICE VISIT (OUTPATIENT)
Age: 53
End: 2023-02-24
Payer: COMMERCIAL

## 2023-02-24 VITALS
SYSTOLIC BLOOD PRESSURE: 120 MMHG | OXYGEN SATURATION: 98 % | WEIGHT: 140.8 LBS | TEMPERATURE: 97.7 F | BODY MASS INDEX: 22.1 KG/M2 | DIASTOLIC BLOOD PRESSURE: 86 MMHG | RESPIRATION RATE: 16 BRPM | HEART RATE: 68 BPM | HEIGHT: 67 IN

## 2023-02-24 DIAGNOSIS — J44.9 STAGE 3 SEVERE COPD BY GOLD CLASSIFICATION (HCC): Primary | ICD-10-CM

## 2023-02-24 PROCEDURE — 3074F SYST BP LT 130 MM HG: CPT | Performed by: INTERNAL MEDICINE

## 2023-02-24 PROCEDURE — 99214 OFFICE O/P EST MOD 30 MIN: CPT | Performed by: INTERNAL MEDICINE

## 2023-02-24 PROCEDURE — 3079F DIAST BP 80-89 MM HG: CPT | Performed by: INTERNAL MEDICINE

## 2023-02-24 NOTE — PROGRESS NOTES
Sirena Tres presents today for   Chief Complaint   Patient presents with    Cough    Wheezing       Is someone accompanying this pt? No    Is the patient using any DME equipment during OV? No    -DME Company NA    Depression Screening:    No flowsheet data found. Learning Needs Questionnaire:     No question data found. Fall Risk:     No flowsheet data found. Abuse Screening:     No flowsheet data found. Coordination of Care:    1. Have you been to the ER, urgent care clinic since your last visit? Hospitalized since your last visit? No    2. Have you seen or consulted any other health care providers outside of the [address] [place] [address] since your last visit? Include any pap smears or colon screening. No    Medication list has been update per patient.
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
## 2023-06-26 RX ORDER — DEXAMETHASONE 4 MG/1
TABLET ORAL
Qty: 12 EACH | Refills: 5 | Status: SHIPPED | OUTPATIENT
Start: 2023-06-26

## 2023-08-11 ENCOUNTER — TELEPHONE (OUTPATIENT)
Age: 53
End: 2023-08-11

## 2023-08-11 RX ORDER — ALBUTEROL SULFATE 2.5 MG/3ML
2.5 SOLUTION RESPIRATORY (INHALATION) 4 TIMES DAILY PRN
Qty: 120 EACH | Refills: 3 | Status: SHIPPED | OUTPATIENT
Start: 2023-08-11

## 2023-08-11 RX ORDER — ALBUTEROL SULFATE 90 UG/1
2 AEROSOL, METERED RESPIRATORY (INHALATION) 4 TIMES DAILY PRN
Qty: 54 G | Refills: 4 | Status: SHIPPED | OUTPATIENT
Start: 2023-08-11

## 2023-08-11 NOTE — TELEPHONE ENCOUNTER
Pt would like a refill on albuterol solution and rescue inhaler. To be sent to [facility] on [address]  For further information [phone]

## 2023-08-11 NOTE — TELEPHONE ENCOUNTER
Prescription for albuterol inhaler-MDI and albuterol vials for nebulizer sent to [facility] on [address] [facility] [address]

## 2023-12-06 ENCOUNTER — OFFICE VISIT (OUTPATIENT)
Facility: CLINIC | Age: 53
End: 2023-12-06
Payer: COMMERCIAL

## 2023-12-06 VITALS
RESPIRATION RATE: 17 BRPM | BODY MASS INDEX: 22.6 KG/M2 | HEIGHT: 67 IN | OXYGEN SATURATION: 98 % | HEART RATE: 66 BPM | DIASTOLIC BLOOD PRESSURE: 78 MMHG | WEIGHT: 144 LBS | SYSTOLIC BLOOD PRESSURE: 119 MMHG

## 2023-12-06 DIAGNOSIS — F10.91 ALCOHOL USE DISORDER IN REMISSION: ICD-10-CM

## 2023-12-06 DIAGNOSIS — F11.21: ICD-10-CM

## 2023-12-06 DIAGNOSIS — F14.91 COCAINE USE DISORDER IN REMISSION: ICD-10-CM

## 2023-12-06 DIAGNOSIS — Z11.59 ENCOUNTER FOR HEPATITIS C SCREENING TEST FOR LOW RISK PATIENT: ICD-10-CM

## 2023-12-06 DIAGNOSIS — J44.9 CHRONIC OBSTRUCTIVE PULMONARY DISEASE, UNSPECIFIED COPD TYPE (HCC): ICD-10-CM

## 2023-12-06 DIAGNOSIS — I25.2 HX OF NON-ST ELEVATION MYOCARDIAL INFARCTION (NSTEMI): ICD-10-CM

## 2023-12-06 DIAGNOSIS — F32.A ANXIETY AND DEPRESSION: ICD-10-CM

## 2023-12-06 DIAGNOSIS — I25.10 CORONARY ARTERY DISEASE INVOLVING NATIVE HEART WITHOUT ANGINA PECTORIS, UNSPECIFIED VESSEL OR LESION TYPE: ICD-10-CM

## 2023-12-06 DIAGNOSIS — Z76.89 ENCOUNTER TO ESTABLISH CARE: Primary | ICD-10-CM

## 2023-12-06 DIAGNOSIS — Z11.3 ROUTINE SCREENING FOR STI (SEXUALLY TRANSMITTED INFECTION): ICD-10-CM

## 2023-12-06 DIAGNOSIS — F41.9 ANXIETY AND DEPRESSION: ICD-10-CM

## 2023-12-06 PROCEDURE — 3078F DIAST BP <80 MM HG: CPT | Performed by: STUDENT IN AN ORGANIZED HEALTH CARE EDUCATION/TRAINING PROGRAM

## 2023-12-06 PROCEDURE — 99204 OFFICE O/P NEW MOD 45 MIN: CPT | Performed by: STUDENT IN AN ORGANIZED HEALTH CARE EDUCATION/TRAINING PROGRAM

## 2023-12-06 PROCEDURE — 3074F SYST BP LT 130 MM HG: CPT | Performed by: STUDENT IN AN ORGANIZED HEALTH CARE EDUCATION/TRAINING PROGRAM

## 2023-12-06 SDOH — ECONOMIC STABILITY: FOOD INSECURITY: WITHIN THE PAST 12 MONTHS, YOU WORRIED THAT YOUR FOOD WOULD RUN OUT BEFORE YOU GOT MONEY TO BUY MORE.: NEVER TRUE

## 2023-12-06 SDOH — ECONOMIC STABILITY: HOUSING INSECURITY
IN THE LAST 12 MONTHS, WAS THERE A TIME WHEN YOU DID NOT HAVE A STEADY PLACE TO SLEEP OR SLEPT IN A SHELTER (INCLUDING NOW)?: NO

## 2023-12-06 SDOH — ECONOMIC STABILITY: FOOD INSECURITY: WITHIN THE PAST 12 MONTHS, THE FOOD YOU BOUGHT JUST DIDN'T LAST AND YOU DIDN'T HAVE MONEY TO GET MORE.: NEVER TRUE

## 2023-12-06 SDOH — ECONOMIC STABILITY: INCOME INSECURITY: HOW HARD IS IT FOR YOU TO PAY FOR THE VERY BASICS LIKE FOOD, HOUSING, MEDICAL CARE, AND HEATING?: NOT HARD AT ALL

## 2023-12-06 ASSESSMENT — PATIENT HEALTH QUESTIONNAIRE - PHQ9
1. LITTLE INTEREST OR PLEASURE IN DOING THINGS: 1
9. THOUGHTS THAT YOU WOULD BE BETTER OFF DEAD, OR OF HURTING YOURSELF: 0
7. TROUBLE CONCENTRATING ON THINGS, SUCH AS READING THE NEWSPAPER OR WATCHING TELEVISION: 3
SUM OF ALL RESPONSES TO PHQ QUESTIONS 1-9: 2
3. TROUBLE FALLING OR STAYING ASLEEP: 3
6. FEELING BAD ABOUT YOURSELF - OR THAT YOU ARE A FAILURE OR HAVE LET YOURSELF OR YOUR FAMILY DOWN: 1
2. FEELING DOWN, DEPRESSED OR HOPELESS: 1
SUM OF ALL RESPONSES TO PHQ QUESTIONS 1-9: 14
SUM OF ALL RESPONSES TO PHQ QUESTIONS 1-9: 2
SUM OF ALL RESPONSES TO PHQ QUESTIONS 1-9: 14
2. FEELING DOWN, DEPRESSED OR HOPELESS: 1
SUM OF ALL RESPONSES TO PHQ QUESTIONS 1-9: 2
SUM OF ALL RESPONSES TO PHQ QUESTIONS 1-9: 14
5. POOR APPETITE OR OVEREATING: 1
SUM OF ALL RESPONSES TO PHQ QUESTIONS 1-9: 14
1. LITTLE INTEREST OR PLEASURE IN DOING THINGS: 1
4. FEELING TIRED OR HAVING LITTLE ENERGY: 3
SUM OF ALL RESPONSES TO PHQ9 QUESTIONS 1 & 2: 2
8. MOVING OR SPEAKING SO SLOWLY THAT OTHER PEOPLE COULD HAVE NOTICED. OR THE OPPOSITE, BEING SO FIGETY OR RESTLESS THAT YOU HAVE BEEN MOVING AROUND A LOT MORE THAN USUAL: 1
SUM OF ALL RESPONSES TO PHQ QUESTIONS 1-9: 2
SUM OF ALL RESPONSES TO PHQ9 QUESTIONS 1 & 2: 2

## 2023-12-06 ASSESSMENT — ANXIETY QUESTIONNAIRES
4. TROUBLE RELAXING: 3
7. FEELING AFRAID AS IF SOMETHING AWFUL MIGHT HAPPEN: 3
5. BEING SO RESTLESS THAT IT IS HARD TO SIT STILL: 3
1. FEELING NERVOUS, ANXIOUS, OR ON EDGE: 2
2. NOT BEING ABLE TO STOP OR CONTROL WORRYING: 3
GAD7 TOTAL SCORE: 20
6. BECOMING EASILY ANNOYED OR IRRITABLE: 3
3. WORRYING TOO MUCH ABOUT DIFFERENT THINGS: 3

## 2023-12-06 ASSESSMENT — ENCOUNTER SYMPTOMS
ABDOMINAL PAIN: 0
SHORTNESS OF BREATH: 0

## 2023-12-06 NOTE — PROGRESS NOTES
Leslie Castaneda is a 48 y.o. presents today for   Chief Complaint   Patient presents with    Establish Care     Patient here to establish care with provider        Is someone accompanying this pt? no    Is the patient using any DME equipment during OV? no    Depression Screenin/6/2023     9:22 AM   PHQ-9 Questionaire   Little interest or pleasure in doing things 1   Feeling down, depressed, or hopeless 1   PHQ-9 Total Score 2       Abuse Screening:       No data to display                Learning Assessment:  No question data found. Fall Risk:       No data to display                    Coordination of Care:   1. \"Have you been to the ER, urgent care clinic since your last visit? Hospitalized since your last visit? \" no    2. \"Have you seen or consulted any other health care providers outside of the [address] [place] [address] since your last visit? \" no    3. For patients aged 43-73: Has the patient had a colonoscopy / FIT/ Cologuard? yes    If the patient is female:    4. For patients aged 43-66: Has the patient had a mammogram within the past 2 years? no    5. For patients aged 21-65: Has the patient had a pap smear? no    Health Maintenance: reviewed and discussed and ordered per Provider.     Health Maintenance Due   Topic Date Due    Hepatitis B vaccine (1 of 3 - 3-dose series) Never done    COVID-19 Vaccine (1) Never done    Depression Screen  Never done    Hepatitis C screen  Never done    Colorectal Cancer Screen  Never done    Shingles vaccine (1 of 2) Never done    Lipids  10/05/2021    A1C test (Diabetic or Prediabetic)  2022    Pneumococcal 0-64 years Vaccine (2 - PCV) 2022    Flu vaccine (1) 2023
fever.   Respiratory:  Negative for shortness of breath. Cardiovascular:  Negative for chest pain. Gastrointestinal:  Negative for abdominal pain. Genitourinary:  Negative for dysuria. Neurological:  Negative for dizziness, light-headedness and headaches. Psychiatric/Behavioral:  Positive for dysphoric mood. The patient is nervous/anxious. Physical Exam  Vital signs:   Vitals:    12/06/23 0924   BP: 119/78   Site: Right Upper Arm   Position: Sitting   Cuff Size: Small Adult   Pulse: 66   Resp: 17   SpO2: 98%   Weight: 65.3 kg (144 lb)   Height: 1.702 m (5' 7\")       General: alert, oriented, not in distress  Chest/Lungs: clear breath sounds, no wheezing or crackles  Heart: normal rate, regular rhythm, no murmur  Extremities: no focal deformities, no edema  Skin: no active skin lesions      Assessment/Plan:    Candia Crigler was seen today for establish care. Diagnoses and all orders for this visit:    Encounter to establish care  Coronary artery disease involving native heart without angina pectoris, unspecified vessel or lesion type  Hx of non-ST elevation myocardial infarction (NSTEMI)  -stable  -continue management per Cardiology  -     Comprehensive Metabolic Panel; Future  -     CBC with Auto Differential; Future  -     Lipid Panel; Future  -     HEMOGLOBIN A1C W/O EAG;  Future    Chronic obstructive pulmonary disease, unspecified COPD type ([address])  -stable  -continue management per Pulm, pt encouraged to call and schedule f/u with Pulm  -did not schedule 3 month follow up, overdue for PFTs    Anxiety and depression  Alcohol use disorder in remission  Moderate heroin use disorder in sustained remission (HCC)  Cocaine use disorder in remission  -pt with possible undiagnosed mental illness in addition to depression/anxiety  -will refer to psychiatry for evaluation  -     External Referral To Psychiatry    Routine screening for STI (sexually transmitted infection)  -     HIV 1/2 Ag/Ab, 4TH Generation,W Rflx

## 2023-12-13 ENCOUNTER — HOSPITAL ENCOUNTER (OUTPATIENT)
Facility: HOSPITAL | Age: 53
Setting detail: SPECIMEN
Discharge: HOME | End: 2023-12-16
Payer: COMMERCIAL

## 2023-12-13 DIAGNOSIS — Z11.59 ENCOUNTER FOR HEPATITIS C SCREENING TEST FOR LOW RISK PATIENT: ICD-10-CM

## 2023-12-13 DIAGNOSIS — I25.10 CORONARY ARTERY DISEASE INVOLVING NATIVE HEART WITHOUT ANGINA PECTORIS, UNSPECIFIED VESSEL OR LESION TYPE: ICD-10-CM

## 2023-12-13 DIAGNOSIS — Z11.3 ROUTINE SCREENING FOR STI (SEXUALLY TRANSMITTED INFECTION): ICD-10-CM

## 2023-12-13 DIAGNOSIS — I25.2 HX OF NON-ST ELEVATION MYOCARDIAL INFARCTION (NSTEMI): ICD-10-CM

## 2023-12-13 LAB
ALBUMIN SERPL-MCNC: 4 G/DL (ref 3.4–5)
ALBUMIN/GLOB SERPL: 1.4 (ref 0.8–1.7)
ALP SERPL-CCNC: 66 U/L (ref 45–117)
ALT SERPL-CCNC: 42 U/L (ref 16–61)
ANION GAP SERPL CALC-SCNC: 3 MMOL/L (ref 3–18)
AST SERPL-CCNC: 27 U/L (ref 10–38)
BASOPHILS # BLD: 0.1 K/UL (ref 0–0.1)
BASOPHILS NFR BLD: 1 % (ref 0–2)
BILIRUB SERPL-MCNC: 1 MG/DL (ref 0.2–1)
BUN SERPL-MCNC: 15 MG/DL (ref 7–18)
BUN/CREAT SERPL: 15 (ref 12–20)
CALCIUM SERPL-MCNC: 9.2 MG/DL (ref 8.5–10.1)
CHLORIDE SERPL-SCNC: 106 MMOL/L (ref 100–111)
CHOLEST SERPL-MCNC: 130 MG/DL
CO2 SERPL-SCNC: 29 MMOL/L (ref 21–32)
CREAT SERPL-MCNC: 1.02 MG/DL (ref 0.6–1.3)
DIFFERENTIAL METHOD BLD: ABNORMAL
EOSINOPHIL # BLD: 0.5 K/UL (ref 0–0.4)
EOSINOPHIL NFR BLD: 7 % (ref 0–5)
ERYTHROCYTE [DISTWIDTH] IN BLOOD BY AUTOMATED COUNT: 13.1 % (ref 11.6–14.5)
GLOBULIN SER CALC-MCNC: 2.8 G/DL (ref 2–4)
GLUCOSE SERPL-MCNC: 113 MG/DL (ref 74–99)
HBA1C MFR BLD: 5.5 % (ref 4.2–5.6)
HCT VFR BLD AUTO: 44.4 % (ref 36–48)
HDLC SERPL-MCNC: 56 MG/DL (ref 40–60)
HDLC SERPL: 2.3 (ref 0–5)
HGB BLD-MCNC: 14.6 G/DL (ref 13–16)
IMM GRANULOCYTES # BLD AUTO: 0 K/UL (ref 0–0.04)
IMM GRANULOCYTES NFR BLD AUTO: 0 % (ref 0–0.5)
LDLC SERPL CALC-MCNC: 65.4 MG/DL (ref 0–100)
LIPID PANEL: NORMAL
LYMPHOCYTES # BLD: 2.8 K/UL (ref 0.9–3.6)
LYMPHOCYTES NFR BLD: 40 % (ref 21–52)
MCH RBC QN AUTO: 31 PG (ref 24–34)
MCHC RBC AUTO-ENTMCNC: 32.9 G/DL (ref 31–37)
MCV RBC AUTO: 94.3 FL (ref 78–100)
MONOCYTES # BLD: 0.5 K/UL (ref 0.05–1.2)
MONOCYTES NFR BLD: 7 % (ref 3–10)
NEUTS SEG # BLD: 3.2 K/UL (ref 1.8–8)
NEUTS SEG NFR BLD: 45 % (ref 40–73)
NRBC # BLD: 0 K/UL (ref 0–0.01)
NRBC BLD-RTO: 0 PER 100 WBC
PLATELET # BLD AUTO: 315 K/UL (ref 135–420)
PMV BLD AUTO: 10.2 FL (ref 9.2–11.8)
POTASSIUM SERPL-SCNC: 4.9 MMOL/L (ref 3.5–5.5)
PROT SERPL-MCNC: 6.8 G/DL (ref 6.4–8.2)
RBC # BLD AUTO: 4.71 M/UL (ref 4.35–5.65)
RPR SER QL: NONREACTIVE
SODIUM SERPL-SCNC: 138 MMOL/L (ref 136–145)
TRIGL SERPL-MCNC: 43 MG/DL
VLDLC SERPL CALC-MCNC: 8.6 MG/DL
WBC # BLD AUTO: 7.1 K/UL (ref 4.6–13.2)

## 2023-12-13 PROCEDURE — 86592 SYPHILIS TEST NON-TREP QUAL: CPT

## 2023-12-13 PROCEDURE — 87389 HIV-1 AG W/HIV-1&-2 AB AG IA: CPT

## 2023-12-13 PROCEDURE — 86803 HEPATITIS C AB TEST: CPT

## 2023-12-13 PROCEDURE — 85025 COMPLETE CBC W/AUTO DIFF WBC: CPT

## 2023-12-13 PROCEDURE — 87661 TRICHOMONAS VAGINALIS AMPLIF: CPT

## 2023-12-13 PROCEDURE — 80061 LIPID PANEL: CPT

## 2023-12-13 PROCEDURE — 80053 COMPREHEN METABOLIC PANEL: CPT

## 2023-12-13 PROCEDURE — 87491 CHLMYD TRACH DNA AMP PROBE: CPT

## 2023-12-13 PROCEDURE — 87591 N.GONORRHOEAE DNA AMP PROB: CPT

## 2023-12-13 PROCEDURE — 36415 COLL VENOUS BLD VENIPUNCTURE: CPT

## 2023-12-13 PROCEDURE — 83036 HEMOGLOBIN GLYCOSYLATED A1C: CPT

## 2023-12-14 LAB
C TRACH RRNA SPEC QL NAA+PROBE: NEGATIVE
HCV AB SER IA-ACNC: 0.08 INDEX
HCV AB SERPL QL IA: NEGATIVE
HEPATITIS C COMMENT: NORMAL
HIV 1+2 AB+HIV1 P24 AG SERPL QL IA: NONREACTIVE
HIV 1/2 RESULT COMMENT: NORMAL
N GONORRHOEA RRNA SPEC QL NAA+PROBE: NEGATIVE
SPECIMEN SOURCE: NORMAL
T VAGINALIS RRNA SPEC QL NAA+PROBE: NEGATIVE

## 2024-02-09 ENCOUNTER — TELEPHONE (OUTPATIENT)
Age: 54
End: 2024-02-09

## 2024-02-09 RX ORDER — PREDNISONE 10 MG/1
TABLET ORAL
Qty: 18 TABLET | Refills: 0 | Status: SHIPPED | OUTPATIENT
Start: 2024-02-09

## 2024-02-09 RX ORDER — DOXYCYCLINE HYCLATE 100 MG
100 TABLET ORAL 2 TIMES DAILY
Qty: 20 TABLET | Refills: 0 | Status: SHIPPED | OUTPATIENT
Start: 2024-02-09 | End: 2024-02-19

## 2024-02-09 NOTE — TELEPHONE ENCOUNTER
Pt wants to know If you would send in a steroid. He had an apt today but it was cancelled due to incomplete PFTS prior to apt. He has PFT's on 2/28 and fu with NELIDA Alexandra in June. Samanta is working on getting him in sooner if possible. Pt says he's congested, wheezing and SOB.

## 2024-02-28 ENCOUNTER — PROCEDURE VISIT (OUTPATIENT)
Age: 54
End: 2024-02-28

## 2024-02-28 VITALS — BODY MASS INDEX: 22.29 KG/M2 | WEIGHT: 142 LBS | RESPIRATION RATE: 14 BRPM | HEIGHT: 67 IN

## 2024-02-28 DIAGNOSIS — J44.9 CHRONIC OBSTRUCTIVE PULMONARY DISEASE, UNSPECIFIED COPD TYPE (HCC): Primary | ICD-10-CM

## 2024-03-11 RX ORDER — ALBUTEROL SULFATE 2.5 MG/3ML
2.5 SOLUTION RESPIRATORY (INHALATION) 4 TIMES DAILY PRN
Qty: 300 ML | Refills: 3 | Status: SHIPPED | OUTPATIENT
Start: 2024-03-11

## 2024-03-11 RX ORDER — FLUTICASONE PROPIONATE 110 UG/1
AEROSOL, METERED RESPIRATORY (INHALATION)
Qty: 12 EACH | Refills: 4 | Status: SHIPPED | OUTPATIENT
Start: 2024-03-11

## 2024-10-07 ENCOUNTER — TELEPHONE (OUTPATIENT)
Age: 54
End: 2024-10-07

## 2024-12-09 RX ORDER — ALBUTEROL SULFATE 0.83 MG/ML
2.5 SOLUTION RESPIRATORY (INHALATION) 4 TIMES DAILY
Qty: 300 ML | Refills: 2 | Status: SHIPPED | OUTPATIENT
Start: 2024-12-09

## 2024-12-12 ENCOUNTER — OFFICE VISIT (OUTPATIENT)
Age: 54
End: 2024-12-12

## 2024-12-12 VITALS
SYSTOLIC BLOOD PRESSURE: 124 MMHG | BODY MASS INDEX: 22.38 KG/M2 | HEART RATE: 63 BPM | HEIGHT: 67 IN | RESPIRATION RATE: 14 BRPM | DIASTOLIC BLOOD PRESSURE: 73 MMHG | OXYGEN SATURATION: 98 % | WEIGHT: 142.6 LBS | TEMPERATURE: 97.4 F

## 2024-12-12 DIAGNOSIS — Z72.0 TOBACCO ABUSE: Primary | ICD-10-CM

## 2024-12-12 DIAGNOSIS — J44.9 STAGE 3 SEVERE COPD BY GOLD CLASSIFICATION (HCC): ICD-10-CM

## 2024-12-12 RX ORDER — PREDNISONE 20 MG/1
20 TABLET ORAL 2 TIMES DAILY
Qty: 10 TABLET | Refills: 0 | Status: SHIPPED | OUTPATIENT
Start: 2024-12-12 | End: 2024-12-17

## 2024-12-12 RX ORDER — FLUTICASONE FUROATE, UMECLIDINIUM BROMIDE AND VILANTEROL TRIFENATATE 100; 62.5; 25 UG/1; UG/1; UG/1
1 POWDER RESPIRATORY (INHALATION) DAILY
Qty: 2 EACH | Refills: 3 | Status: SHIPPED | COMMUNITY
Start: 2024-12-12

## 2024-12-12 RX ORDER — FLUTICASONE FUROATE, UMECLIDINIUM BROMIDE AND VILANTEROL TRIFENATATE 100; 62.5; 25 UG/1; UG/1; UG/1
1 POWDER RESPIRATORY (INHALATION) DAILY
Qty: 1 EACH | Refills: 3 | Status: SHIPPED | OUTPATIENT
Start: 2024-12-12
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
## 2024-12-12 NOTE — PROGRESS NOTES
Jesse Joshua presents today for   Chief Complaint   Patient presents with    COPD       Is someone accompanying this pt? no    Is the patient using any DME equipment during OV? no    -DME Company no    Depression Screenin/6/2023     1:49 PM   PHQ-9 Questionaire   Little interest or pleasure in doing things 1   Feeling down, depressed, or hopeless 1   Trouble falling or staying asleep, or sleeping too much 3   Feeling tired or having little energy 3   Poor appetite or overeating 1   Feeling bad about yourself - or that you are a failure or have let yourself or your family down 1   Trouble concentrating on things, such as reading the newspaper or watching television 3   Moving or speaking so slowly that other people could have noticed. Or the opposite - being so fidgety or restless that you have been moving around a lot more than usual 1   Thoughts that you would be better off dead, or of hurting yourself in some way 0   PHQ-9 Total Score 14       Learning Assessment:    Failed to redirect to the Timeline version of the [employer] SmartLink.    Abuse Screening:         No data to display                Fall Risk    Failed to redirect to the Timeline version of the [employer] SmartLink.    Coordination of Care:    1. Have you been to the ER, urgent care clinic since your last visit? Hospitalized since your last visit? no    2. Have you seen or consulted any other health care providers outside of the [facility] since your last visit? Include any pap smears or colon screening. no    Medication list has been update per patient.

## 2025-02-04 ENCOUNTER — TELEPHONE (OUTPATIENT)
Age: 55
End: 2025-02-04

## 2025-02-04 NOTE — TELEPHONE ENCOUNTER
Advised that they will need to complete the patient section and sign the form will email it to joe at [email or web address] -  once we receive it back will have Dr. Cueva sign for Dr. Stern and will fax to bruce

## 2025-02-04 NOTE — TELEPHONE ENCOUNTER
Pt's wife called in regards to needing a letter to go to [employer] for the pt stating that they need his nebulizer to stay on. The power will be cut off today. The last letter had  and they need a new letter. Please rush if possible. Please fax to [employer] if possible

## 2025-03-12 ENCOUNTER — OFFICE VISIT (OUTPATIENT)
Age: 55
End: 2025-03-12
Payer: MEDICAID

## 2025-03-12 VITALS
OXYGEN SATURATION: 97 % | TEMPERATURE: 97.4 F | DIASTOLIC BLOOD PRESSURE: 73 MMHG | RESPIRATION RATE: 14 BRPM | WEIGHT: 140.2 LBS | SYSTOLIC BLOOD PRESSURE: 115 MMHG | HEART RATE: 57 BPM | BODY MASS INDEX: 22 KG/M2 | HEIGHT: 67 IN

## 2025-03-12 DIAGNOSIS — Z72.0 TOBACCO ABUSE: Primary | ICD-10-CM

## 2025-03-12 DIAGNOSIS — J44.9 STAGE 3 SEVERE COPD BY GOLD CLASSIFICATION (HCC): ICD-10-CM

## 2025-03-12 PROCEDURE — 99214 OFFICE O/P EST MOD 30 MIN: CPT | Performed by: HOSPITALIST

## 2025-03-12 PROCEDURE — 99406 BEHAV CHNG SMOKING 3-10 MIN: CPT | Performed by: HOSPITALIST

## 2025-03-12 PROCEDURE — 3074F SYST BP LT 130 MM HG: CPT | Performed by: HOSPITALIST

## 2025-03-12 PROCEDURE — 3078F DIAST BP <80 MM HG: CPT | Performed by: HOSPITALIST

## 2025-03-12 RX ORDER — ALBUTEROL SULFATE 90 UG/1
2 INHALANT RESPIRATORY (INHALATION) 4 TIMES DAILY PRN
Qty: 54 G | Refills: 4 | Status: SHIPPED | OUTPATIENT
Start: 2025-03-12

## 2025-03-12 NOTE — PROGRESS NOTES
Jesse Joshua presents today for   Chief Complaint   Patient presents with    COPD       Is someone accompanying this pt? no    Is the patient using any DME equipment during OV? no    -DME Company no    Depression Screenin/6/2023     1:49 PM   PHQ-9 Questionaire   Little interest or pleasure in doing things 1   Feeling down, depressed, or hopeless 1   Trouble falling or staying asleep, or sleeping too much 3   Feeling tired or having little energy 3   Poor appetite or overeating 1   Feeling bad about yourself - or that you are a failure or have let yourself or your family down 1   Trouble concentrating on things, such as reading the newspaper or watching television 3   Moving or speaking so slowly that other people could have noticed. Or the opposite - being so fidgety or restless that you have been moving around a lot more than usual 1   Thoughts that you would be better off dead, or of hurting yourself in some way 0   PHQ-9 Total Score 14       Learning Assessment:    Failed to redirect to the Timeline version of the [employer] SmartLink.    Abuse Screening:         No data to display                Fall Risk    Failed to redirect to the Timeline version of the [employer] SmartLink.    Coordination of Care:    1. Have you been to the ER, urgent care clinic since your last visit? Hospitalized since your last visit? no    2. Have you seen or consulted any other health care providers outside of the [facility] since your last visit? Include any pap smears or colon screening. no    Medication list has been update per patient.  
tibia-fibula.      _______________      FINDINGS:      BONES: No evidence of acute fracture or dislocation. Joint spaces and alignment   are maintained. No aggressive appearing osseous lytic or blastic lesion.   Superior patellar enthesophyte.      SOFT TISSUES: Unremarkable.      _______________      Impression   No evidence of acute fracture or dislocation.      CT Results (most recent):   Results from Hospital Encounter encounter on 12/01/20      CTA CHEST W OR W WO CONT      Narrative   EXAM: CTA chest      INDICATION: Shortness of breath.      COMPARISON: CT chest on 4/13/2020      TECHNIQUE: Helical volumetric scanning was performed from the thoracic inlet   through the diaphragm during pulmonary arterial phase of nonionic IV contrast   enhancement. Axial reconstructions, and slab MIP coronal and sagittal reformats   were generated.  One or more dose reduction techniques were used on this CT:   automated exposure control, adjustment of the mAs and/or kVp according to   patient size, and iterative reconstruction techniques.  The specific techniques   used on this CT exam have been documented in the patient's electronic medical   record.  Digital Imaging and Communications in Medicine (DICOM) format image   data are available to nonaffiliated external healthcare facilities or entities   on a secure, media free, reciprocally searchable basis with patient   authorization for at least a 12-month period after this study.      _______________      FINDINGS:      EXAM QUALITY: Overall exam quality is optimal, pulmonary arterial enhancement is   optimal, breath hold is optimal, other factors affecting quality are absent.      PULMONARY ARTERIES: No evidence of pulmonary embolism.      MEDIASTINUM: Normal heart size without evidence of right heart strain. The   ascending thoracic aorta is ectatic measuring 3.5 cm without change. Aorta   appears intact. Left coronary stent noted.      LUNGS: Lungs are hyperinflated. Mild

## 2025-03-22 DIAGNOSIS — J44.9 CHRONIC OBSTRUCTIVE PULMONARY DISEASE, UNSPECIFIED COPD TYPE (HCC): Primary | ICD-10-CM

## 2025-03-24 RX ORDER — ALBUTEROL SULFATE 0.83 MG/ML
2.5 SOLUTION RESPIRATORY (INHALATION) 4 TIMES DAILY
Qty: 300 ML | Refills: 2 | Status: SHIPPED | OUTPATIENT
Start: 2025-03-24

## 2025-03-28 RX ORDER — FLUTICASONE FUROATE, UMECLIDINIUM BROMIDE AND VILANTEROL TRIFENATATE 100; 62.5; 25 UG/1; UG/1; UG/1
POWDER RESPIRATORY (INHALATION)
Qty: 1 EACH | Refills: 5 | Status: SHIPPED | OUTPATIENT
Start: 2025-03-28
